# Patient Record
Sex: FEMALE | Race: ASIAN | Employment: FULL TIME | URBAN - METROPOLITAN AREA
[De-identification: names, ages, dates, MRNs, and addresses within clinical notes are randomized per-mention and may not be internally consistent; named-entity substitution may affect disease eponyms.]

---

## 2024-05-22 LAB
AMB EXT HEPATITIS C VIRUS ANTIBODY: NEGATIVE
ANTIBODY SCREEN OB: NEGATIVE
HEPATITIS B SURFACE ANTIGEN OB: NEGATIVE
HIV RESULT OB: NEGATIVE
RH FACTOR OB: POSITIVE

## 2024-07-17 ENCOUNTER — HOSPITAL ENCOUNTER (INPATIENT)
Facility: HOSPITAL | Age: 34
LOS: 3 days | Discharge: HOME | DRG: 817 | End: 2024-07-20
Attending: STUDENT IN AN ORGANIZED HEALTH CARE EDUCATION/TRAINING PROGRAM | Admitting: OBSTETRICS & GYNECOLOGY
Payer: COMMERCIAL

## 2024-07-17 ENCOUNTER — ULTRASOUND ENCOUNTER (OUTPATIENT)
Dept: PERINATAL CARE | Facility: HOSPITAL | Age: 34
End: 2024-07-17
Attending: OBSTETRICS & GYNECOLOGY
Payer: COMMERCIAL

## 2024-07-17 ENCOUNTER — HOSPITAL ENCOUNTER (INPATIENT)
Facility: HOSPITAL | Age: 34
LOS: 3 days | Discharge: HOME | End: 2024-07-20
Attending: STUDENT IN AN ORGANIZED HEALTH CARE EDUCATION/TRAINING PROGRAM | Admitting: OBSTETRICS & GYNECOLOGY
Payer: COMMERCIAL

## 2024-07-17 DIAGNOSIS — O42.912: Primary | ICD-10-CM

## 2024-07-17 LAB
ALBUMIN SERPL-MCNC: 3.8 G/DL (ref 3.2–4.8)
ALBUMIN SERPL-MCNC: 3.9 G/DL (ref 3.2–4.8)
ALBUMIN/GLOB SERPL: 1.2 {RATIO} (ref 1–2)
ALBUMIN/GLOB SERPL: 1.2 {RATIO} (ref 1–2)
ALP LIVER SERPL-CCNC: 75 U/L
ALP LIVER SERPL-CCNC: 76 U/L
ALT SERPL-CCNC: 127 U/L
ALT SERPL-CCNC: 141 U/L
ANION GAP SERPL CALC-SCNC: 6 MMOL/L (ref 0–18)
ANION GAP SERPL CALC-SCNC: 7 MMOL/L (ref 0–18)
ANTIBODY SCREEN: NEGATIVE
AST SERPL-CCNC: 49 U/L (ref ?–34)
AST SERPL-CCNC: 58 U/L (ref ?–34)
BASOPHILS # BLD AUTO: 0.04 X10(3) UL (ref 0–0.2)
BASOPHILS # BLD AUTO: 0.05 X10(3) UL (ref 0–0.2)
BASOPHILS NFR BLD AUTO: 0.2 %
BASOPHILS NFR BLD AUTO: 0.3 %
BILIRUB SERPL-MCNC: 0.3 MG/DL (ref 0.3–1.2)
BILIRUB SERPL-MCNC: 0.4 MG/DL (ref 0.3–1.2)
BUN BLD-MCNC: 5 MG/DL (ref 9–23)
BUN BLD-MCNC: <5 MG/DL (ref 9–23)
CALCIUM BLD-MCNC: 8.9 MG/DL (ref 8.7–10.4)
CALCIUM BLD-MCNC: 9.2 MG/DL (ref 8.7–10.4)
CHLORIDE SERPL-SCNC: 106 MMOL/L (ref 98–112)
CHLORIDE SERPL-SCNC: 108 MMOL/L (ref 98–112)
CO2 SERPL-SCNC: 24 MMOL/L (ref 21–32)
CO2 SERPL-SCNC: 24 MMOL/L (ref 21–32)
CREAT BLD-MCNC: 0.47 MG/DL
CREAT BLD-MCNC: 0.53 MG/DL
EGFRCR SERPLBLD CKD-EPI 2021: 126 ML/MIN/1.73M2 (ref 60–?)
EGFRCR SERPLBLD CKD-EPI 2021: 130 ML/MIN/1.73M2 (ref 60–?)
EOSINOPHIL # BLD AUTO: 0.06 X10(3) UL (ref 0–0.7)
EOSINOPHIL # BLD AUTO: 0.14 X10(3) UL (ref 0–0.7)
EOSINOPHIL NFR BLD AUTO: 0.3 %
EOSINOPHIL NFR BLD AUTO: 0.9 %
ERYTHROCYTE [DISTWIDTH] IN BLOOD BY AUTOMATED COUNT: 13.1 %
ERYTHROCYTE [DISTWIDTH] IN BLOOD BY AUTOMATED COUNT: 13.3 %
GLOBULIN PLAS-MCNC: 3.2 G/DL (ref 2.8–4.4)
GLOBULIN PLAS-MCNC: 3.3 G/DL (ref 2.8–4.4)
GLUCOSE BLD-MCNC: 100 MG/DL (ref 70–99)
GLUCOSE BLD-MCNC: 99 MG/DL (ref 70–99)
HCT VFR BLD AUTO: 37.2 %
HCT VFR BLD AUTO: 38.4 %
HGB BLD-MCNC: 12.7 G/DL
HGB BLD-MCNC: 13.4 G/DL
IMM GRANULOCYTES # BLD AUTO: 0.15 X10(3) UL (ref 0–1)
IMM GRANULOCYTES # BLD AUTO: 0.16 X10(3) UL (ref 0–1)
IMM GRANULOCYTES NFR BLD: 0.7 %
IMM GRANULOCYTES NFR BLD: 1.1 %
LYMPHOCYTES # BLD AUTO: 2.01 X10(3) UL (ref 1–4)
LYMPHOCYTES # BLD AUTO: 2.66 X10(3) UL (ref 1–4)
LYMPHOCYTES NFR BLD AUTO: 17.7 %
LYMPHOCYTES NFR BLD AUTO: 9.8 %
MCH RBC QN AUTO: 29.8 PG (ref 26–34)
MCH RBC QN AUTO: 29.9 PG (ref 26–34)
MCHC RBC AUTO-ENTMCNC: 34.1 G/DL (ref 31–37)
MCHC RBC AUTO-ENTMCNC: 34.9 G/DL (ref 31–37)
MCV RBC AUTO: 85.3 FL
MCV RBC AUTO: 87.5 FL
MONOCYTES # BLD AUTO: 0.93 X10(3) UL (ref 0.1–1)
MONOCYTES # BLD AUTO: 1.25 X10(3) UL (ref 0.1–1)
MONOCYTES NFR BLD AUTO: 6.1 %
MONOCYTES NFR BLD AUTO: 6.2 %
NEUTROPHILS # BLD AUTO: 11.06 X10 (3) UL (ref 1.5–7.7)
NEUTROPHILS # BLD AUTO: 11.06 X10(3) UL (ref 1.5–7.7)
NEUTROPHILS # BLD AUTO: 16.95 X10 (3) UL (ref 1.5–7.7)
NEUTROPHILS # BLD AUTO: 16.95 X10(3) UL (ref 1.5–7.7)
NEUTROPHILS NFR BLD AUTO: 73.8 %
NEUTROPHILS NFR BLD AUTO: 82.9 %
OSMOLALITY SERPL CALC.SUM OF ELEC: 281 MOSM/KG (ref 275–295)
PLATELET # BLD AUTO: 229 10(3)UL (ref 150–450)
PLATELET # BLD AUTO: 234 10(3)UL (ref 150–450)
POTASSIUM SERPL-SCNC: 3.2 MMOL/L (ref 3.5–5.1)
POTASSIUM SERPL-SCNC: 3.6 MMOL/L (ref 3.5–5.1)
PROT SERPL-MCNC: 7 G/DL (ref 5.7–8.2)
PROT SERPL-MCNC: 7.2 G/DL (ref 5.7–8.2)
RBC # BLD AUTO: 4.25 X10(6)UL
RBC # BLD AUTO: 4.5 X10(6)UL
RH BLOOD TYPE: POSITIVE
RH BLOOD TYPE: POSITIVE
SODIUM SERPL-SCNC: 137 MMOL/L (ref 136–145)
SODIUM SERPL-SCNC: 138 MMOL/L (ref 136–145)
WBC # BLD AUTO: 15 X10(3) UL (ref 4–11)
WBC # BLD AUTO: 20.5 X10(3) UL (ref 4–11)

## 2024-07-17 PROCEDURE — 76805 OB US >/= 14 WKS SNGL FETUS: CPT | Performed by: OBSTETRICS & GYNECOLOGY

## 2024-07-17 RX ORDER — CHOLECALCIFEROL (VITAMIN D3) 25 MCG
1 TABLET,CHEWABLE ORAL DAILY
Status: DISCONTINUED | OUTPATIENT
Start: 2024-07-17 | End: 2024-07-20

## 2024-07-17 RX ORDER — SODIUM CHLORIDE, SODIUM LACTATE, POTASSIUM CHLORIDE, CALCIUM CHLORIDE 600; 310; 30; 20 MG/100ML; MG/100ML; MG/100ML; MG/100ML
INJECTION, SOLUTION INTRAVENOUS CONTINUOUS
Status: DISCONTINUED | OUTPATIENT
Start: 2024-07-17 | End: 2024-07-20

## 2024-07-17 RX ORDER — DOCUSATE SODIUM 100 MG/1
100 CAPSULE, LIQUID FILLED ORAL 2 TIMES DAILY
Status: DISCONTINUED | OUTPATIENT
Start: 2024-07-17 | End: 2024-07-18

## 2024-07-17 RX ORDER — AZELAIC ACID 0.15 G/G
1 GEL TOPICAL 2 TIMES DAILY
COMMUNITY

## 2024-07-17 RX ORDER — CHOLECALCIFEROL (VITAMIN D3) 25 MCG
1 TABLET,CHEWABLE ORAL DAILY
COMMUNITY

## 2024-07-17 RX ORDER — ZOLPIDEM TARTRATE 5 MG/1
5 TABLET ORAL NIGHTLY PRN
Status: DISCONTINUED | OUTPATIENT
Start: 2024-07-17 | End: 2024-07-20

## 2024-07-17 RX ORDER — CALCIUM CARBONATE 500 MG/1
1000 TABLET, CHEWABLE ORAL
Status: DISCONTINUED | OUTPATIENT
Start: 2024-07-17 | End: 2024-07-20

## 2024-07-17 RX ORDER — ACETAMINOPHEN 500 MG
1000 TABLET ORAL EVERY 6 HOURS PRN
Status: DISCONTINUED | OUTPATIENT
Start: 2024-07-17 | End: 2024-07-18

## 2024-07-17 RX ORDER — ACETAMINOPHEN 500 MG
500 TABLET ORAL EVERY 6 HOURS PRN
Status: DISCONTINUED | OUTPATIENT
Start: 2024-07-17 | End: 2024-07-20

## 2024-07-17 NOTE — ED PROVIDER NOTES
History     Chief Complaint   Patient presents with    Pregnancy Issues       HPI    34 year old female a1 at 19 weeks gest here for evaluation.  Patient states she got up a couple hours prior to arrival and noted a large gush of fluid from her vaginal area that she felt was her water breaking.  She has been having some cramps in her lower pelvis region since then.  She has a 20-week ultrasound scheduled for next week.  She has not had any prior issues with this pregnancy.  She does have a prior history of laparoscopic myomectomy for fibroids.    During examination patient had another gush of fluid here.      History reviewed. No pertinent past medical history.    History reviewed. No pertinent surgical history.    Social History     Socioeconomic History    Marital status: Single   Tobacco Use    Smoking status: Never    Smokeless tobacco: Never   Vaping Use    Vaping status: Never Used   Substance and Sexual Activity    Alcohol use: Never    Drug use: Never     Social Determinants of Health     Financial Resource Strain: Not at Risk (2024)    Received from [facility]    Financial Resource Strain     Financial Resource Strain: 1   Food Insecurity: Not at Risk (2024)    Received from [facility]    Food Insecurity     Food: 1   Transportation Needs: Not at Risk (2024)    Received from [facility]    Transportation Needs     Transportation: 1   Stress: Not on File (2024)    Received from [facility]    Stress     Stress: 0   Housing Stability: Not at Risk (2024)    Received from [facility]    Housing Stability     Housin                   Physical Exam     ED Triage Vitals   BP 24 0615 108/72   Pulse 24 0615 105   Resp 24 0615 18   Temp 24 0621 98.2 °F (36.8 °C)   Temp src 24 0621 Oral   SpO2 24 0615 100 %   O2 Device 24 0615 None (Room air)       Physical Exam  Constitutional:       General: She is not in acute distress.  Eyes:      Extraocular Movements:  Extraocular movements intact.   Cardiovascular:      Rate and Rhythm: Normal rate.      Pulses: Normal pulses.   Pulmonary:      Effort: Pulmonary effort is normal. No respiratory distress.   Abdominal:      General: There is no distension.      Tenderness: There is abdominal tenderness (pelvic).      Comments: gravid   Genitourinary:     Comments: Sterile external examination with pink fluid originating from vaginal canal, spec exam deferred.  Musculoskeletal:      Cervical back: Normal range of motion.   Neurological:      General: No focal deficit present.      Mental Status: She is alert.              ED Course     Labs Reviewed   CBC WITH DIFFERENTIAL WITH PLATELET    Narrative:     The following orders were created for panel order CBC With Differential With Platelet.  Procedure                               Abnormality         Status                     ---------                               -----------         ------                     CBC W/ DIFFERENTIAL[[id number]]                              In process                   Please view results for these tests on the individual orders.   COMP METABOLIC PANEL (14)   ABORH (BLOOD TYPE)   RAINBOW DRAW LAVENDER   RAINBOW DRAW LIGHT GREEN   RAINBOW DRAW BLUE   RAINBOW DRAW GOLD   CBC W/ DIFFERENTIAL     No results found.        MDM     Vitals:    24 0615 24 0621   BP: 108/72    Pulse: 105    Resp: 18    Temp:  98.2 °F (36.8 °C)   TempSrc:  Oral   SpO2: 100%    Weight: 74.8 kg    Height: 170.2 cm (5' 7\")        Concern for premature rupture of membranes,  labor.  Blood pressure is reassuring.  No bright blood or prior ultrasonography to suggest previa.  I discussed with OB, recommends labor delivery admission for further care  Taken up to OB Triage.             Disposition and Plan     Clinical Impression:  1. Premature rupture of membranes in second trimester, unspecified duration to onset of labor (HCC)        Disposition:  Send to  l&d    Follow-up:  No follow-up provider specified.    Medications Prescribed:  There are no discharge medications for this patient.

## 2024-07-17 NOTE — PROGRESS NOTES
Pt reports increased frequency need for urination, denies cramping, contractions or back pain.   States streaks of bleeding has  stopped when wiping.

## 2024-07-17 NOTE — PROGRESS NOTES
I spoke with patient again after her consult with Dr. Mccarty this morning.  Reviewed findings of previable PPROM, anhydramnios, and prolapsing fetal extremity into cervix.  We reviewed option for induction of labor vs expectant management.  Reviewed with expectant management there is increased risk of infection and resulting sepsis.  Also reviewed potential for reaching viability, but delivering fetus with severe deficits due to early PPROM.  We also reviewed possible risk of IUFD.  She expressed understanding.  At this time will plan expectant management and inpatient observation.  Recommend observing for 24-48 hours after ROM.  If she is stable at that time and does not desire IOL, can consider discharge home with close outpatient follow up.    Tiffanie Younger MD

## 2024-07-17 NOTE — PROGRESS NOTES
34 year old  at 19.5 weeks gestation presents to triage with c/o gush of fluid at 0445 this morning.  Pt c/o mild cramping and some mild spotting this morning as well.  No fluid leaking at this time.  Monitors applied.  Dr Stuart notified of pt arrival on unit.  Plan of care discussed.

## 2024-07-17 NOTE — PROGRESS NOTES
At bedside, emotional support provided.  Dr Stuart at bedside.   POC discussed, Dr. Mccarty to see pt.

## 2024-07-17 NOTE — H&P
[facility]  History & Physical    Kelly Wood Patient Status:  Inpatient    1990 MRN [id number]   Location [facility] LABOR & DELIVERY Attending Sonia Stuart MD   Hosp Day # 0 PCP Oniel Ritter MD     SUBJECTIVE:    Kelly Wood is a 34 year old  woman at 19w5d gestation who presents with LOF at 0445 today.  She reports no vaginal bleeding, and no contractions.  She reports no fevers/chills.    Obstetric History:   OB History    Para Term  AB Living   2             SAB IAB Ectopic Multiple Live Births                  # Outcome Date GA Lbr Gwyn/2nd Weight Sex Type Anes PTL Lv   2 Current            1               Past Medical History: History reviewed. No pertinent past medical history.  Past Social History: History reviewed. No pertinent surgical history.  Family History: No family history on file.  Social History:   Social History     Tobacco Use    Smoking status: Never    Smokeless tobacco: Never   Substance Use Topics    Alcohol use: Never       Home Meds:   No medications prior to admission.     Allergies: No Known Allergies    OBJECTIVE:    Temp:  [98.2 °F (36.8 °C)-98.7 °F (37.1 °C)] 98.7 °F (37.1 °C)  Pulse:  [104-105] 104  Resp:  [18] 18  BP: (108-113)/(66-72) 113/66  SpO2:  [100 %] 100 %    Abdomen:  soft, nontender, gravid   Fetal Surveillance:  120s     Cervix: Grossly ruptured, unable to visualize cervix due to large amount of fluid   Bedside ultrasound: breech, no visible fluid  Lab Review:    Admission on 2024   Component Date Value    ABO BLOOD TYPE 2024 O     RH BLOOD TYPE 2024 Positive     WBC 2024 15.0 (H)     RBC 2024 4.50     HGB 2024 13.4     HCT 2024 38.4     PLT 2024 229.0     MCV 2024 85.3     MCH 2024 29.8     MCHC 2024 34.9     RDW 2024 13.1     Neutrophil Absolute Prel* 2024 11.06 (H)     Neutrophil Absolute 2024 11.06 (H)     Lymphocyte Absolute  07/17/2024 2.66     Monocyte Absolute 07/17/2024 0.93     Eosinophil Absolute 07/17/2024 0.14     Basophil Absolute 07/17/2024 0.05     Immature Granulocyte Abs* 07/17/2024 0.16     Neutrophil % 07/17/2024 73.8     Lymphocyte % 07/17/2024 17.7     Monocyte % 07/17/2024 6.2     Eosinophil % 07/17/2024 0.9     Basophil % 07/17/2024 0.3     Immature Granulocyte % 07/17/2024 1.1         ASSESSMENT:    19w5d gestation, PPROM    PLAN:    Discussed risks of previable PPROM  Reviewed option of expectant management vs IOL including risks to mother  Plan MFM consult

## 2024-07-17 NOTE — PROGRESS NOTES
@ bedside, Pt and  deciding to proceed with expectant management.   Bed changed to post partum bed.   Continued emotional support provided.  Pt continues to reports scant amount of fluid.  No contractions, cramping or back pain.   Abdomen soft, non tender to touch.

## 2024-07-17 NOTE — PROGRESS NOTES
Dr. Mccarty and Dr Younger at bedside.   Ultrasound completed.   All questions answered, POC and options discussed.  Supportive care provided.

## 2024-07-18 ENCOUNTER — ANESTHESIA (OUTPATIENT)
Dept: OBGYN UNIT | Facility: HOSPITAL | Age: 34
End: 2024-07-18
Payer: COMMERCIAL

## 2024-07-18 ENCOUNTER — APPOINTMENT (OUTPATIENT)
Dept: ULTRASOUND IMAGING | Facility: HOSPITAL | Age: 34
End: 2024-07-18
Attending: OBSTETRICS & GYNECOLOGY
Payer: COMMERCIAL

## 2024-07-18 ENCOUNTER — APPOINTMENT (OUTPATIENT)
Dept: ULTRASOUND IMAGING | Facility: HOSPITAL | Age: 34
DRG: 817 | End: 2024-07-18
Attending: OBSTETRICS & GYNECOLOGY
Payer: COMMERCIAL

## 2024-07-18 ENCOUNTER — ANESTHESIA EVENT (OUTPATIENT)
Dept: OBGYN UNIT | Facility: HOSPITAL | Age: 34
End: 2024-07-18
Payer: COMMERCIAL

## 2024-07-18 LAB
ALBUMIN SERPL-MCNC: 4 G/DL (ref 3.2–4.8)
ALBUMIN/GLOB SERPL: 1.2 {RATIO} (ref 1–2)
ALP LIVER SERPL-CCNC: 76 U/L
ALT SERPL-CCNC: 138 U/L
ANION GAP SERPL CALC-SCNC: 6 MMOL/L (ref 0–18)
APTT PPP: 27.5 SECONDS (ref 23–36)
AST SERPL-CCNC: 54 U/L (ref ?–34)
BASOPHILS # BLD AUTO: 0.05 X10(3) UL (ref 0–0.2)
BASOPHILS # BLD AUTO: 0.07 X10(3) UL (ref 0–0.2)
BASOPHILS NFR BLD AUTO: 0.3 %
BASOPHILS NFR BLD AUTO: 0.4 %
BILIRUB SERPL-MCNC: 0.6 MG/DL (ref 0.3–1.2)
BUN BLD-MCNC: <5 MG/DL (ref 9–23)
CALCIUM BLD-MCNC: 9.2 MG/DL (ref 8.7–10.4)
CHLORIDE SERPL-SCNC: 106 MMOL/L (ref 98–112)
CO2 SERPL-SCNC: 23 MMOL/L (ref 21–32)
CREAT BLD-MCNC: 0.51 MG/DL
EGFRCR SERPLBLD CKD-EPI 2021: 127 ML/MIN/1.73M2 (ref 60–?)
EOSINOPHIL # BLD AUTO: 0.12 X10(3) UL (ref 0–0.7)
EOSINOPHIL # BLD AUTO: 0.16 X10(3) UL (ref 0–0.7)
EOSINOPHIL NFR BLD AUTO: 0.7 %
EOSINOPHIL NFR BLD AUTO: 0.8 %
ERYTHROCYTE [DISTWIDTH] IN BLOOD BY AUTOMATED COUNT: 13.1 %
ERYTHROCYTE [DISTWIDTH] IN BLOOD BY AUTOMATED COUNT: 13.3 %
EST. AVERAGE GLUCOSE BLD GHB EST-MCNC: 100 MG/DL (ref 68–126)
FIBRINOGEN PPP-MCNC: 675 MG/DL (ref 200–480)
GLOBULIN PLAS-MCNC: 3.3 G/DL (ref 2.8–4.4)
GLUCOSE BLD-MCNC: 93 MG/DL (ref 70–99)
HBA1C MFR BLD: 5.1 % (ref ?–5.7)
HCT VFR BLD AUTO: 37.7 %
HCT VFR BLD AUTO: 37.9 %
HGB BLD-MCNC: 12.8 G/DL
HGB BLD-MCNC: 13.1 G/DL
IMM GRANULOCYTES # BLD AUTO: 0.11 X10(3) UL (ref 0–1)
IMM GRANULOCYTES # BLD AUTO: 0.12 X10(3) UL (ref 0–1)
IMM GRANULOCYTES NFR BLD: 0.6 %
IMM GRANULOCYTES NFR BLD: 0.7 %
INR BLD: 1.04 (ref 0.8–1.2)
KLEIHAUER BETKE RESULT: NEGATIVE
LYMPHOCYTES # BLD AUTO: 2.24 X10(3) UL (ref 1–4)
LYMPHOCYTES # BLD AUTO: 2.44 X10(3) UL (ref 1–4)
LYMPHOCYTES NFR BLD AUTO: 12.2 %
LYMPHOCYTES NFR BLD AUTO: 12.8 %
MCH RBC QN AUTO: 29.4 PG (ref 26–34)
MCH RBC QN AUTO: 29.6 PG (ref 26–34)
MCHC RBC AUTO-ENTMCNC: 33.8 G/DL (ref 31–37)
MCHC RBC AUTO-ENTMCNC: 34.7 G/DL (ref 31–37)
MCV RBC AUTO: 85.1 FL
MCV RBC AUTO: 86.9 FL
MONOCYTES # BLD AUTO: 1.29 X10(3) UL (ref 0.1–1)
MONOCYTES # BLD AUTO: 1.39 X10(3) UL (ref 0.1–1)
MONOCYTES NFR BLD AUTO: 7 %
MONOCYTES NFR BLD AUTO: 7.3 %
NEUTROPHILS # BLD AUTO: 14.5 X10 (3) UL (ref 1.5–7.7)
NEUTROPHILS # BLD AUTO: 14.5 X10(3) UL (ref 1.5–7.7)
NEUTROPHILS # BLD AUTO: 14.84 X10 (3) UL (ref 1.5–7.7)
NEUTROPHILS # BLD AUTO: 14.84 X10(3) UL (ref 1.5–7.7)
NEUTROPHILS NFR BLD AUTO: 78.1 %
NEUTROPHILS NFR BLD AUTO: 79.1 %
PLATELET # BLD AUTO: 239 10(3)UL (ref 150–450)
PLATELET # BLD AUTO: 242 10(3)UL (ref 150–450)
POTASSIUM SERPL-SCNC: 3.4 MMOL/L (ref 3.5–5.1)
PROT SERPL-MCNC: 7.3 G/DL (ref 5.7–8.2)
PROTHROMBIN TIME: 13.6 SECONDS (ref 11.6–14.8)
RBC # BLD AUTO: 4.36 X10(6)UL
RBC # BLD AUTO: 4.43 X10(6)UL
SODIUM SERPL-SCNC: 135 MMOL/L (ref 136–145)
T PALLIDUM AB SER QL IA: NONREACTIVE
WBC # BLD AUTO: 18.3 X10(3) UL (ref 4–11)
WBC # BLD AUTO: 19 X10(3) UL (ref 4–11)

## 2024-07-18 PROCEDURE — 10D17ZZ EXTRACTION OF PRODUCTS OF CONCEPTION, RETAINED, VIA NATURAL OR ARTIFICIAL OPENING: ICD-10-PCS | Performed by: OBSTETRICS & GYNECOLOGY

## 2024-07-18 PROCEDURE — 76998 US GUIDE INTRAOP: CPT | Performed by: OBSTETRICS & GYNECOLOGY

## 2024-07-18 RX ORDER — MISOPROSTOL 200 UG/1
600 TABLET ORAL EVERY 6 HOURS SCHEDULED
Status: DISCONTINUED | OUTPATIENT
Start: 2024-07-18 | End: 2024-07-19

## 2024-07-18 RX ORDER — IBUPROFEN 600 MG/1
600 TABLET, FILM COATED ORAL EVERY 6 HOURS PRN
Status: DISCONTINUED | OUTPATIENT
Start: 2024-07-18 | End: 2024-07-20

## 2024-07-18 RX ORDER — TRANEXAMIC ACID 10 MG/ML
INJECTION, SOLUTION INTRAVENOUS
Status: DISPENSED
Start: 2024-07-18 | End: 2024-07-19

## 2024-07-18 RX ORDER — BISACODYL 10 MG
10 SUPPOSITORY, RECTAL RECTAL ONCE AS NEEDED
Status: DISCONTINUED | OUTPATIENT
Start: 2024-07-18 | End: 2024-07-20

## 2024-07-18 RX ORDER — SIMETHICONE 80 MG
80 TABLET,CHEWABLE ORAL 3 TIMES DAILY PRN
Status: DISCONTINUED | OUTPATIENT
Start: 2024-07-18 | End: 2024-07-20

## 2024-07-18 RX ORDER — MISOPROSTOL 200 UG/1
400 TABLET ORAL ONCE
Status: COMPLETED | OUTPATIENT
Start: 2024-07-18 | End: 2024-07-18

## 2024-07-18 RX ORDER — ACETAMINOPHEN 500 MG
500 TABLET ORAL EVERY 6 HOURS PRN
Status: DISCONTINUED | OUTPATIENT
Start: 2024-07-18 | End: 2024-07-18

## 2024-07-18 RX ORDER — MISOPROSTOL 200 UG/1
400 TABLET ORAL
Status: DISCONTINUED | OUTPATIENT
Start: 2024-07-18 | End: 2024-07-18

## 2024-07-18 RX ORDER — MISOPROSTOL 200 UG/1
TABLET ORAL
Status: DISPENSED
Start: 2024-07-18 | End: 2024-07-19

## 2024-07-18 RX ORDER — TRANEXAMIC ACID 10 MG/ML
INJECTION, SOLUTION INTRAVENOUS AS NEEDED
Status: DISCONTINUED | OUTPATIENT
Start: 2024-07-18 | End: 2024-07-18 | Stop reason: SURG

## 2024-07-18 RX ORDER — NALBUPHINE HYDROCHLORIDE 10 MG/ML
2.5 INJECTION INTRAMUSCULAR; INTRAVENOUS; SUBCUTANEOUS
Status: DISCONTINUED | OUTPATIENT
Start: 2024-07-18 | End: 2024-07-20

## 2024-07-18 RX ORDER — ACETAMINOPHEN 500 MG
500 TABLET ORAL EVERY 6 HOURS PRN
Status: DISCONTINUED | OUTPATIENT
Start: 2024-07-18 | End: 2024-07-20

## 2024-07-18 RX ORDER — MIDAZOLAM HYDROCHLORIDE 1 MG/ML
INJECTION INTRAMUSCULAR; INTRAVENOUS AS NEEDED
Status: DISCONTINUED | OUTPATIENT
Start: 2024-07-18 | End: 2024-07-18 | Stop reason: SURG

## 2024-07-18 RX ORDER — IBUPROFEN 600 MG/1
600 TABLET, FILM COATED ORAL EVERY 6 HOURS
Status: DISCONTINUED | OUTPATIENT
Start: 2024-07-18 | End: 2024-07-20

## 2024-07-18 RX ORDER — LIDOCAINE HCL/EPINEPHRINE/PF 2%-1:200K
VIAL (ML) INJECTION AS NEEDED
Status: DISCONTINUED | OUTPATIENT
Start: 2024-07-18 | End: 2024-07-18 | Stop reason: SURG

## 2024-07-18 RX ORDER — DOCUSATE SODIUM 100 MG/1
100 CAPSULE, LIQUID FILLED ORAL
Status: DISCONTINUED | OUTPATIENT
Start: 2024-07-18 | End: 2024-07-20

## 2024-07-18 RX ORDER — ONDANSETRON 2 MG/ML
4 INJECTION INTRAMUSCULAR; INTRAVENOUS EVERY 6 HOURS PRN
Status: DISCONTINUED | OUTPATIENT
Start: 2024-07-18 | End: 2024-07-20

## 2024-07-18 RX ORDER — SODIUM CHLORIDE, SODIUM LACTATE, POTASSIUM CHLORIDE, CALCIUM CHLORIDE 600; 310; 30; 20 MG/100ML; MG/100ML; MG/100ML; MG/100ML
INJECTION, SOLUTION INTRAVENOUS CONTINUOUS
Status: DISCONTINUED | OUTPATIENT
Start: 2024-07-18 | End: 2024-07-20

## 2024-07-18 RX ORDER — BUPIVACAINE HCL/0.9 % NACL/PF 0.25 %
5 PLASTIC BAG, INJECTION (ML) EPIDURAL AS NEEDED
Status: DISCONTINUED | OUTPATIENT
Start: 2024-07-18 | End: 2024-07-20

## 2024-07-18 RX ORDER — ACETAMINOPHEN 500 MG
1000 TABLET ORAL EVERY 6 HOURS PRN
Status: DISCONTINUED | OUTPATIENT
Start: 2024-07-18 | End: 2024-07-20

## 2024-07-18 RX ADMIN — SODIUM CHLORIDE, SODIUM LACTATE, POTASSIUM CHLORIDE, CALCIUM CHLORIDE: 600; 310; 30; 20 INJECTION, SOLUTION INTRAVENOUS at 18:45:00

## 2024-07-18 RX ADMIN — SODIUM CHLORIDE, SODIUM LACTATE, POTASSIUM CHLORIDE, CALCIUM CHLORIDE: 600; 310; 30; 20 INJECTION, SOLUTION INTRAVENOUS at 19:52:00

## 2024-07-18 RX ADMIN — MIDAZOLAM HYDROCHLORIDE 1 MG: 1 INJECTION INTRAMUSCULAR; INTRAVENOUS at 19:16:00

## 2024-07-18 RX ADMIN — LIDOCAINE HCL/EPINEPHRINE/PF 5 ML: 2%-1:200K VIAL (ML) INJECTION at 18:46:00

## 2024-07-18 RX ADMIN — MIDAZOLAM HYDROCHLORIDE 1 MG: 1 INJECTION INTRAMUSCULAR; INTRAVENOUS at 19:12:00

## 2024-07-18 RX ADMIN — TRANEXAMIC ACID 1000 MG: 10 INJECTION, SOLUTION INTRAVENOUS at 19:19:00

## 2024-07-18 NOTE — PROGRESS NOTES
Patient comfortable with epidural  SVE: fetal parts partially through cervix  First dose of cytotec administered  Patient and partner declining autopsy and chromosome analysis       Ivon Cunningham MD

## 2024-07-18 NOTE — PROGRESS NOTES
At bedside, questions answered.  Discussion with pt and  to doppler FHTs for recording.   Pts  declines at this time.

## 2024-07-18 NOTE — PROGRESS NOTES
Cervix assess again at bedside, placenta not yet delivered  Will administer additional dose of cytotec  Bleeding stable  If placenta still not delivered, will plan to proceed with suction D&C

## 2024-07-18 NOTE — PROGRESS NOTES
Met with patient at bedside  Umbilical cord prolapse noted this AM at approximately 0500 with lack of umbilical cord pulsation; patient aware that this likely means fetus has passed, findings confirmed on bedside ultrasound  Patient also reporting foul odor when she went to the bathroom most recently, denies pain, fevers, chills, etc at this time  SVE: Fetal parts starting to come through cervix, dilation approximately 4-5cm at this time  Given IUFD and possible developing chorioamnionitis, recommend induction with cytotec as well as antibiotics now  Discussed pain control with epidural prior to initiation of induction of labor, patient agreeable  Discussed induction of labor with cytotec with history of myomectomy is not contraindicated in the second trimester  Discussed possible need for suction D&C for removal of placenta if it does not spontaneously deliver  Patient and family members understandably upset and with lots of questions about how this all happened, all questions answered        Ivon Cunningham MD

## 2024-07-18 NOTE — PROGRESS NOTES
RN called to room. Pt states she \"feels something coming through her cervix\". Visualized perineum and what appeared to be a possible loop of the umbilical cord. Pt denies feeling any pain or an urge to push, appears comfortable at this time.  Dr. Younger in house and notified via phone. Dr. Younger states to continue expectant management.

## 2024-07-18 NOTE — L&D DELIVERY NOTE
Nina, Pending [[id number]]      Labor Events     labor?: No   steroids?: None  Antibiotics received during labor?: No  Rupture date/time: 2024 0445     Rupture type: SROM  Fluid color: Clear  Labor type: Spontaneous Onset of Labor        Presentation    No data filed       Operative Delivery    No data filed       Shoulder Dystocia    No data filed       Anesthesia    Method: Epidural               Delivery      Delivery date/time:  24 15:22:00   Delivery type: Normal spontaneous vaginal delivery    Details:     Delivery location: delivery room       Delivery Providers       Delivery personnel:  Provider Role    Baby Nurse    Delivery Nurse             Cord    No data filed        Measurements    No data filed       Placenta    Removal: Spontaneous       Apgars    No data filed       Skin to Skin    No data filed       Vaginal Count    No data filed       Lacerations    No data filed                                                                      Vaginal Delivery Note          Kelly Wood Patient Status:  Inpatient    1990 MRN [id number]   [facility] LABOR & DELIVERY Attending Sonia Stuart MD   Hosp Day # 1 PCP Oniel Ritter MD       Delivery: Normal spontaneous vaginal delivery  Surgeon: Ivon Cunningham MD  Anesthesia: Epidural  QBL: pending  Infant: female    Brief history and labor course:    Patient was already delivered by the time I was able to get to the room. Grossly normal appearing  was noted.    Placenta not delivered when I arrived. On vaginal exam, placenta was partially in cervix. Bleeding stable. Will continue to monitor at this time and defer surgical management for removal of placenta provided bleeding continues to stay stable.    Complications:  None    Ivon Cunningham MD

## 2024-07-18 NOTE — PROGRESS NOTES
RN @ bedside. Pt declines ultrasound at this time. States she would like to wait a day or two to prepare herself mentally for what might be found.

## 2024-07-18 NOTE — ANESTHESIA PROCEDURE NOTES
Labor Analgesia    Date/Time: 7/18/2024 1:35 PM    Performed by: Jewel Gutierrez DO  Authorized by: Jeewl Gutierrez DO      General Information and Staff    Start Time:  7/18/2024 1:35 PM  End Time:  7/18/2024 1:37 PM  Anesthesiologist:  Jewel Gutierrez DO  Performed by:  Anesthesiologist  Patient Location:  OB  Site Identification: surface landmarks    Reason for Block: labor epidural    Preanesthetic Checklist: patient identified, IV checked, risks and benefits discussed, monitors and equipment checked, pre-op evaluation, timeout performed, IV bolus, anesthesia consent and sterile technique used      Procedure Details    Patient Position:  Sitting  Prep: ChloraPrep    Monitoring:  Heart rate and continuous pulse ox  Approach:  Midline    Epidural Needle    Injection Technique:  YOUSIF air  Needle Type:  Tuohy  Needle Gauge:  17 G  Needle Length:  3.375 in  Needle Insertion Depth:  4  Location:  L3-4    Spinal Needle      Catheter    Catheter Type:  End hole  Catheter Size:  19 G  Catheter at Skin Depth:  11  Test Dose:  Negative    Assessment    Sensory Level:  T10    Additional Comments

## 2024-07-18 NOTE — PROGRESS NOTES
Share referral received. Met with patient and family at bedside to provide support. Comfort tote brought to patient. Share information given. Offered family mementos; pictures/footprints. Patient declined at this time. Patient aware will receive follow up call within the week. Will continue to be available for further support as needed.

## 2024-07-18 NOTE — PROGRESS NOTES
Spoke with patient again this evening.  Family report being hopeful for a positive outcome.  I reiterated that this circumstance is dire for fetus, and the chance of survival is very low.  WBC increased to 20.  LFTs elevated, unclear etiology.  BP is normal.  Plan to check RUQ US.

## 2024-07-18 NOTE — ANESTHESIA PREPROCEDURE EVALUATION
PRE-OP EVALUATION    Patient Name: Kelly Wood    Admit Diagnosis: Premature rupture of membranes in second trimester, unspecified duration to onset of labor (HCC) [O42.912]    Pre-op Diagnosis: * No pre-op diagnosis entered *        Anesthesia Procedure: LABOR ANALGESIA    * No surgeons found in log *    Pre-op vitals reviewed.  Temp: 98.7 °F (37.1 °C)  Pulse: 103  Resp: 16  BP: 100/78     Body mass index is 25.22 kg/m².    Current medications reviewed.  Hospital Medications:   oxyTOCIN in sodium chloride 0.9% (Pitocin) 30 Units/500mL infusion premix        acetaminophen (Tylenol Extra Strength) tab 500 mg  500 mg Oral Q6H PRN    ibuprofen (Motrin) tab 600 mg  600 mg Oral Q6H PRN    ondansetron (Zofran) 4 MG/2ML injection 4 mg  4 mg Intravenous Q6H PRN    lactated ringers infusion   Intravenous Continuous    miSOPROStol (Cytotec) tab 400 mcg  400 mcg Vaginal Once    Followed by    miSOPROStol (Cytotec) tab 400 mcg  400 mcg Vaginal 6 times per day    ampicillin (Omnipen) 2 g in sodium chloride 0.9% 100 mL IVPB-MBP  2 g Intravenous Q6H    gentamicin (Garamycin) 300 mg in sodium chloride 0.9% 100 mL IVPB  5 mg/kg (Ideal) Intravenous Daily    lactated ringers IV bolus 1,000 mL  1,000 mL Intravenous Once    fentaNYL-bupivacaine 2 mcg/mL-0.125% in sodium chloride 0.9% 100 mL EPIDURAL infusion premix  12 mL/hr Epidural Continuous    fentaNYL (Sublimaze) 50 mcg/mL injection 100 mcg  100 mcg Epidural Once    EPHEDrine (PF) 25 MG/5 ML injection 5 mg  5 mg Intravenous PRN    nalbuphine (Nubain) 10 mg/mL injection 2.5 mg  2.5 mg Intravenous Q15 Min PRN    lactated ringers infusion   Intravenous Continuous    acetaminophen (Tylenol Extra Strength) tab 500 mg  500 mg Oral Q6H PRN    Or    acetaminophen (Tylenol Extra Strength) tab 1,000 mg  1,000 mg Oral Q6H PRN    zolpidem (Ambien) tab 5 mg  5 mg Oral Nightly PRN    docusate sodium (Colace) cap 100 mg  100 mg Oral BID    calcium carbonate (Tums) chewable tab 1,000 mg  1,000  mg Oral Daily PRN    prenatal vitamin with DHA (PNV with DHA) cap 1 capsule  1 capsule Oral Daily       Outpatient Medications:     Medications Prior to Admission   Medication Sig Dispense Refill Last Dose    prenatal vitamin with DHA 27-0.8-228 MG Oral Cap Take 1 capsule by mouth daily.   2024    Azelaic Acid 15 % External Gel Apply 1 Application topically 2 (two) times daily.   More than a month       Allergies: Patient has no known allergies.      Anesthesia Evaluation    Patient summary reviewed.    Anesthetic Complications  (-) history of anesthetic complications         GI/Hepatic/Renal    Negative GI/hepatic/renal ROS.                             Cardiovascular        Exercise tolerance: good     MET: >4                                           Endo/Other    Negative endo/other ROS.                              Pulmonary    Negative pulmonary ROS.                       Neuro/Psych    Negative neuro/psych ROS.                           fetal demise 19w, platelets ok        Past Surgical History:   Procedure Laterality Date    Prior myomectomy       Social History     Socioeconomic History    Marital status: Single   Tobacco Use    Smoking status: Never    Smokeless tobacco: Never   Vaping Use    Vaping status: Never Used   Substance and Sexual Activity    Alcohol use: Never    Drug use: Never     History   Drug Use Unknown     Available pre-op labs reviewed.  Lab Results   Component Value Date    WBC 18.3 (H) 2024    RBC 4.36 2024    HGB 12.8 2024    HCT 37.9 2024    MCV 86.9 2024    MCH 29.4 2024    MCHC 33.8 2024    RDW 13.3 2024    .0 2024     Lab Results   Component Value Date     (L) 2024    K 3.4 (L) 2024     2024    CO2 23.0 2024    BUN <5 (L) 2024    CREATSERUM 0.51 (L) 2024    GLU 93 2024    CA 9.2 2024     Lab Results   Component Value Date    INR 1.04 2024          Airway      Mallampati: II  Mouth opening: 3 FB  TM distance: > 6 cm  Neck ROM: full Cardiovascular      Rhythm: regular  Rate: normal     Dental             Pulmonary      Breath sounds clear to auscultation bilaterally.               Other findings              ASA: 2   Plan: epidural  NPO status verified and         Comment: discussed  Plan/risks discussed with: patient                Present on Admission:  **None**

## 2024-07-18 NOTE — PROGRESS NOTES
Share referral received due to poor prognosis of viability. After review of pt's chart, it appears that patient and her partner are still hopeful for a positive outcome. Share referral deferred at this time. Will be available for further support as needed.

## 2024-07-18 NOTE — PROGRESS NOTES
Called to room by family. Patient states \"something is coming out\"  Lifted sheet and fetus was spontaneously delivering.  Time of delivery 1522. No signs of life at delivery.  Cord clamped and cut.  Infant taken to warmer.  No heart tones, breathing or movement

## 2024-07-19 PROBLEM — R45.851 PASSIVE SUICIDAL IDEATIONS: Status: ACTIVE | Noted: 2024-07-19

## 2024-07-19 PROBLEM — F43.21 GRIEF: Status: ACTIVE | Noted: 2024-07-19

## 2024-07-19 LAB
ALBUMIN SERPL-MCNC: 3.5 G/DL (ref 3.2–4.8)
ALBUMIN/GLOB SERPL: 1.2 {RATIO} (ref 1–2)
ALP LIVER SERPL-CCNC: 70 U/L
ALT SERPL-CCNC: 99 U/L
ANION GAP SERPL CALC-SCNC: 6 MMOL/L (ref 0–18)
APTT PPP: 29.4 SECONDS (ref 23–36)
AST SERPL-CCNC: 36 U/L (ref ?–34)
B2 GLYCOPROT1 IGG SERPL IA-ACNC: 6.7 U/ML (ref ?–7)
B2 GLYCOPROT1 IGM SERPL IA-ACNC: 2.4 U/ML (ref ?–7)
BASOPHILS # BLD AUTO: 0.04 X10(3) UL (ref 0–0.2)
BASOPHILS NFR BLD AUTO: 0.2 %
BILIRUB SERPL-MCNC: 0.2 MG/DL (ref 0.3–1.2)
BUN BLD-MCNC: 8 MG/DL (ref 9–23)
CALCIUM BLD-MCNC: 8.8 MG/DL (ref 8.7–10.4)
CARDIOLIPIN IGG SERPL-ACNC: 1.3 GPL (ref ?–10)
CARDIOLIPIN IGM SERPL-ACNC: 3.8 MPL (ref ?–10)
CHLORIDE SERPL-SCNC: 108 MMOL/L (ref 98–112)
CMV AB, IGM, INTERP: NEGATIVE
CMV AB, IGM, INTERP: NEGATIVE
CMV AB, IGM: <0.2 AI
CMV AB, IGM: <0.2 AI
CMV IGG AB: 5.2 U/ML
CO2 SERPL-SCNC: 24 MMOL/L (ref 21–32)
CREAT BLD-MCNC: 0.51 MG/DL
EGFRCR SERPLBLD CKD-EPI 2021: 127 ML/MIN/1.73M2 (ref 60–?)
EOSINOPHIL # BLD AUTO: 0.12 X10(3) UL (ref 0–0.7)
EOSINOPHIL NFR BLD AUTO: 0.7 %
ERYTHROCYTE [DISTWIDTH] IN BLOOD BY AUTOMATED COUNT: 13.1 %
GLOBULIN PLAS-MCNC: 2.9 G/DL (ref 2.8–4.4)
GLUCOSE BLD-MCNC: 95 MG/DL (ref 70–99)
HCT VFR BLD AUTO: 34.3 %
HGB BLD-MCNC: 11.6 G/DL
IMM GRANULOCYTES # BLD AUTO: 0.12 X10(3) UL (ref 0–1)
IMM GRANULOCYTES NFR BLD: 0.7 %
INR BLD: 1.04 (ref 0.85–1.16)
LA 3 SCREEN W REFLEX-IMP: POSITIVE
LYMPHOCYTES # BLD AUTO: 2.85 X10(3) UL (ref 1–4)
LYMPHOCYTES NFR BLD AUTO: 17.1 %
MCH RBC QN AUTO: 29.4 PG (ref 26–34)
MCHC RBC AUTO-ENTMCNC: 33.8 G/DL (ref 31–37)
MCV RBC AUTO: 87.1 FL
MONOCYTES # BLD AUTO: 1.09 X10(3) UL (ref 0.1–1)
MONOCYTES NFR BLD AUTO: 6.5 %
NEUTROPHILS # BLD AUTO: 12.45 X10 (3) UL (ref 1.5–7.7)
NEUTROPHILS # BLD AUTO: 12.45 X10(3) UL (ref 1.5–7.7)
NEUTROPHILS NFR BLD AUTO: 74.8 %
OSMOLALITY SERPL CALC.SUM OF ELEC: 284 MOSM/KG (ref 275–295)
PLATELET # BLD AUTO: 230 10(3)UL (ref 150–450)
POTASSIUM SERPL-SCNC: 3.6 MMOL/L (ref 3.5–5.1)
PROT SERPL-MCNC: 6.4 G/DL (ref 5.7–8.2)
PROTHROMBIN TIME: 13.6 SECONDS (ref 11.6–14.8)
RBC # BLD AUTO: 3.94 X10(6)UL
SCREEN DRVVT: 1.11 S (ref 0–1.29)
SCREEN DRVVT: NEGATIVE S
SODIUM SERPL-SCNC: 138 MMOL/L (ref 136–145)
STACLOT LA DELTA: 13.6 SECONDS (ref ?–8)
WBC # BLD AUTO: 16.7 X10(3) UL (ref 4–11)

## 2024-07-19 PROCEDURE — 90792 PSYCH DIAG EVAL W/MED SRVCS: CPT

## 2024-07-19 RX ORDER — DIPHENHYDRAMINE HYDROCHLORIDE 50 MG/ML
INJECTION, SOLUTION INTRAMUSCULAR; INTRAVENOUS
Status: COMPLETED
Start: 2024-07-19 | End: 2024-07-19

## 2024-07-19 RX ORDER — DIPHENHYDRAMINE HYDROCHLORIDE 50 MG/ML
25 INJECTION, SOLUTION INTRAMUSCULAR; INTRAVENOUS EVERY 4 HOURS PRN
Status: DISCONTINUED | OUTPATIENT
Start: 2024-07-19 | End: 2024-07-20

## 2024-07-19 NOTE — PROGRESS NOTES
Postpartum Day 1    Pt without complaints. S/p  previable fetus, D&C.  Denies fevers/chills.      Temp: 98.5 °F (36.9 °C)  Pulse: 95  Resp: 16  BP: 102/52  abd  soft, NT, ND, fundus firm below umbilicus  extr  trace edema, no calf tenderness  Recent Labs   Lab 24  0958   RBC 3.94   HGB 11.6*   HCT 34.3*   MCV 87.1   MCH 29.4   MCHC 33.8   RDW 13.1   NEPRELIM 12.45*   WBC 16.7*   .0       Impression: PPD#1  Plan:  Continue postpartum care.    Continue antibiotics    Abdominal ultrasound

## 2024-07-19 NOTE — CM/SW NOTE
AMISH order acknowledged. Case discussed with RN.  AMISH met with patient, asked other family members to leave the room. Patient tearful affect.   IUFD 19w5d. Patient reports this is her second loss, first loss occurred during first trimester.   Patient reports she has never gotten over the death of her first baby and she does not know how she is going to get over this death. SW provided support and validation.   Patient reports feeling extreme guilt and is scared to go home, due to her no longer being pregnant. Patient reports passive SI, \"I wish I never wake up again\".   AMISH updated RN. Psych on consult. Psych to evaluate.     Swati Hudson, [facility]  /Discharge Planner

## 2024-07-19 NOTE — PLAN OF CARE
Problem: PAIN - ADULT  Goal: Verbalizes/displays adequate comfort level or patient's stated pain goal  Description: INTERVENTIONS:  - Encourage pt to monitor pain and request assistance  - Assess pain using appropriate pain scale  - Administer analgesics based on type and severity of pain and evaluate response  - Implement non-pharmacological measures as appropriate and evaluate response  - Consider cultural and social influences on pain and pain management  - Manage/alleviate anxiety  - Utilize distraction and/or relaxation techniques  - Monitor for opioid side effects  - Notify MD/LIP if interventions unsuccessful or patient reports new pain  - Anticipate increased pain with activity and pre-medicate as appropriate  Outcome: Progressing     Problem: ANXIETY  Goal: Will report anxiety at manageable levels  Description: INTERVENTIONS:  - Administer medication as ordered  - Teach and rehearse alternative coping skills  - Provide emotional support with 1:1 interaction with staff  Outcome: Progressing     Problem: Patient/Family Goals  Goal: Patient/Family Long Term Goal  Description: Patient's Long Term Goal: To return home tomorrow.    Interventions:    - See additional Care Plan goals for specific interventions  Outcome: Progressing  Goal: Patient/Family Short Term Goal  Description: Patient's Short Term Goal: To recover with minimal bleeding and infection free.    Interventions:     - See additional Care Plan goals for specific interventions  Outcome: Progressing     Problem: Anxiety  Goal: Anxiety is at manageable level  Outcome: Progressing     Problem: Spiritual Needs  Goal: Ability to function at adequate level  Outcome: Progressing     Problem: BIRTH - VAGINAL/ SECTION  Goal: Fetal and maternal status remain reassuring during the birth process  Description: INTERVENTIONS:  - Monitor vital signs  - Monitor fetal heart rate  - Monitor uterine activity  - Monitor labor progression (vaginal delivery)  -  DVT prophylaxis (C/S delivery)  - Surgical antibiotic prophylaxis (C/S delivery)  Outcome: Completed

## 2024-07-19 NOTE — PAYOR COMM NOTE
--------------  ADMISSION REVIEW     Payor: [employer] OUT [employer] STATE P[employer]  Subscriber #:  [id number]  Authorization Number: [id number]    Admit date: 24  Admit time:  6:38 AM       REVIEW DOCUMENTATION:     ED Provider Notes          History     Chief Complaint   Patient presents with    Pregnancy Issues       HPI    34 year old female a1 at 19 weeks gest here for evaluation.  Patient states she got up a couple hours prior to arrival and noted a large gush of fluid from her vaginal area that she felt was her water breaking.  She has been having some cramps in her lower pelvis region since then.  She has a 20-week ultrasound scheduled for next week.  She has not had any prior issues with this pregnancy.  She does have a prior history of laparoscopic myomectomy for fibroids.    During examination patient had another gush of fluid here.      History reviewed. No pertinent past medical history.    History reviewed. No pertinent surgical history.        Physical Exam     ED Triage Vitals   BP 24 0615 108/72   Pulse 24 0615 105   Resp 24 0615 18   Temp 24 0621 98.2 °F (36.8 °C)   Temp src 24 0621 Oral   SpO2 24 0615 100 %   O2 Device 24 0615 None (Room air)       Physical Exam  Constitutional:       General: She is not in acute distress.  Eyes:      Extraocular Movements: Extraocular movements intact.   Cardiovascular:      Rate and Rhythm: Normal rate.      Pulses: Normal pulses.   Pulmonary:      Effort: Pulmonary effort is normal. No respiratory distress.   Abdominal:      General: There is no distension.      Tenderness: There is abdominal tenderness (pelvic).      Comments: gravid   Genitourinary:     Comments: Sterile external examination with pink fluid originating from vaginal canal, spec exam deferred.  Musculoskeletal:      Cervical back: Normal range of motion.   Neurological:      General: No focal deficit present.      Mental Status: She is alert.              ED  Course     Labs Reviewed   CBC WITH DIFFERENTIAL WITH PLATELET    Narrative:     The following orders were created for panel order CBC With Differential With Platelet.  Procedure                               Abnormality         Status                     ---------                               -----------         ------                     CBC W/ DIFFERENTIAL[[id number]]                              In process                   Please view results for these tests on the individual orders.   COMP METABOLIC PANEL (14)   ABORH (BLOOD TYPE)   RAINBOW DRAW LAVENDER   RAINBOW DRAW LIGHT GREEN   RAINBOW DRAW BLUE   RAINBOW DRAW GOLD   CBC W/ DIFFERENTIAL     No results found.        [facility]     Vitals:    24 0615 24 0621   BP: 108/72    Pulse: 105    Resp: 18    Temp:  98.2 °F (36.8 °C)   TempSrc:  Oral   SpO2: 100%    Weight: 74.8 kg    Height: 170.2 cm (5' 7\")        Concern for premature rupture of membranes,  labor.  Blood pressure is reassuring.  No bright blood or prior ultrasonography to suggest previa.  I discussed with OB, recommends labor delivery admission for further care  Taken up to OB Triage.             Disposition and Plan     Clinical Impression:  1. Premature rupture of membranes in second trimester, unspecified duration to onset of labor (HCC)        Disposition:  Send to l&d    Follow-up:  No follow-up provider specified.    Medications Prescribed:  There are no discharge medications for this patient.        Signed by Abel Fajardo MD on 2024  6:43 AM               History and Physical    H&P signed by Sonia Stuart MD at 2024  7:19 AM      [facility]  History & Physical         SUBJECTIVE:     Kelly Wood is a 34 year old  woman at 19w5d gestation who presents with LOF at 0445 today.  She reports no vaginal bleeding, and no contractions.  She reports no fevers/chills.     Obstetric History:                    OB History    Para Term  AB Living    2             SAB IAB Ectopic Multiple Live Births                         # Outcome Date GA Lbr [place]/2nd Weight Sex Type Anes PTL Lv   2 Current                     1                         Past Medical History: History reviewed. No pertinent past medical history.  Past Social History: History reviewed. No pertinent surgical history.  Family History: No family history on file.  Social History:   Social History           Tobacco Use    Smoking status: Never    Smokeless tobacco: Never   Substance Use Topics    Alcohol use: Never         Home Meds:   No medications prior to admission.      Allergies: No Known Allergies     OBJECTIVE:     Temp:  [98.2 °F (36.8 °C)-98.7 °F (37.1 °C)] 98.7 °F (37.1 °C)  Pulse:  [104-105] 104  Resp:  [18] 18  BP: (108-113)/(66-72) 113/66  SpO2:  [100 %] 100 %           Abdomen:  soft, nontender, gravid    Fetal Surveillance:  120s      Cervix: Grossly ruptured, unable to visualize cervix due to large amount of fluid   Bedside ultrasound: breech, no visible fluid  Lab Review:           Admission on 2024   Component Date Value    ABO BLOOD TYPE 2024 O     RH BLOOD TYPE 2024 Positive     WBC 2024 15.0 (H)     RBC 2024 4.50     HGB 2024 13.4     HCT 2024 38.4     PLT 2024 229.0     MCV 2024 85.3     MCH 2024 29.8     MCHC 2024 34.9     RDW 2024 13.1     Neutrophil Absolute Prel* 2024 11.06 (H)     Neutrophil Absolute 2024 11.06 (H)     Lymphocyte Absolute 2024 2.66     Monocyte Absolute 2024 0.93     Eosinophil Absolute 2024 0.14     Basophil Absolute 2024 0.05     Immature Granulocyte Abs* 2024 0.16     Neutrophil % 2024 73.8     Lymphocyte % 2024 17.7     Monocyte % 2024 6.2     Eosinophil % 2024 0.9     Basophil % 2024 0.3     Immature Granulocyte % 2024 1.1          ASSESSMENT:     19w5d gestation, PPROM     PLAN:     Discussed  risks of previable PPROM  Reviewed option of expectant management vs IOL including risks to mother  Plan MFM consult            Signed by Sonia Stuart MD on 7/17/2024  7:19 AM       Consult 7/17  Mid-trimester PPROM -      I did review some correctable issues that may lead to mid-trimester PROM.  First I reviewed that uterine malformations may predispose to mid-trimester PROM.  Given the patient's prior myomectomy, congenital malformation of the uterus that was not previously diagnosed is low.  The myomectomy is unlikely to have directly influenced this outcome.  However, an issue such as uterine synechiae may have developed and theoretically lead to uterine cavity compromise.  This is however quite unlikely.  Nevertheless, I advised a uterine cavity evaluation via hysterosonogram or hysterosalpingogram.  If a correctable uterine cavity defect is noted surgical correction would be advised prior to conceiving.     I also reviewed the potential of cervical insufficiency.   We discussed that I strongly suspect cervical insufficiency as the cause, mainly due to her report of increased discharge recently and that the fetal arm is noted in the length of the cervix.     They asked about treatments such as medications to \"heal\" the tear and cervical cerclage.  I explained that such treatment is not available here and that such protocols are experimental.       We discussed the very poor prognosis when there is no residual amniotic fluid.  I explained that the fetus will continue to make amniotic fluid but that it will continue to leak out due to the rupture and that the fetal arm appears to be delivering into the cervix.  I also explained that the dilation, rupture and lack of residual fluid increases her risk for intrauterine infection.  I explained that infection can spread to a life risking process called sepsis.  If a women has PPROM and sepsis in the second trimester, hysterectomy is often needed to save her  life.  I explained that there is no treatment for a fetus at <22 weeks.     We discussed her h/o myomectomy and that IOL at this GA is not contraindicated.  I advised against hysterotomy unless needed at a maternal life preserving measure.  We discussed risks in future pregnancies and briefly anticipated management in future pregnancies (prophylactic cerclage vs serial ultrasound cervical length.     IMPRESSION:    IUP at 19w5d  Mid-trimester PPROM with anhydramnios and evidence of cervical insufficiency  Elevated maternal LFTs, etiology unknown  H/o myomectomy     RECOMMENDATIONS:    I advised moving forward with augmentation/ induction of labor for the health and well being of the patient in light of her early GA and lack of amniotic fluid  Expectant management in the hospital 24 hours with repeat CBC and CMP in ~12 hours     Dr. Hagen was present for the ultrasound and consultation.  She answered additional questions from the patient and her , Al.     Total time spent 60 minutes this calendar day which includes preparing to see the patient including chart review, obtaining and/or reviewing additional medical history, performing a physical exam and evaluation, documenting clinical information in the electronic medical record, independently interpreting results, counseling the patient, communicating results to the patient/family/caregiver and coordinating care.     Case discussed with patient who demonstrated understanding and agreement with plan.     Thank you for allowing me to participate in the care of this patient.  Please feel free to contact me with any questions.     Alexus Mccarty MD  Maternal-Fetal Medicine     7/17/2024  8:27 AM    7/18 procedure    Operative Report signed by Ivon Cunningham MD at 7/18/2024  8:59 PM    Author: Ivon Cunningham MD Service: Obstetrics Author Type: Physician   Filed: 7/18/2024  8:59 PM Date of Service: 7/18/2024  7:07 PM Status: Signed   : Ivon Cunningham MD  (Physician)     [facility]  Operative Note     Pre-Op Diagnosis: retained placenta     Post-Op Diagnosis: same     Procedure: suction D&C under ultrasound guidance     Surgeon: Ivon Cunningham MD     Anesthesia: epidural     Surgical Findings:  Grossly normal appearing placenta  Normal appearing cervix and vagina  Thin endometrial stripe on ultrasound post-procedure     Specimen: placenta     EBL:  100cc during procedure, approximately 450cc since delivery     Complications: None     Disposition: Recovery room in stable condition     Patient was taken to operating room and she already had an epidural from labor process. She was prepped and draped in the usual sterile fashion in the dorsal lithotomy position. EUA revealed the aforementioned findings. Pierre retractors were placed in the vagina and cervix was identified. The majority of the placenta was seen coming through the cervix and this was removed. Anterior lip of cervix was grasped with a ring forcep.  A size 14 curved curette was inserted into the uterus and suction device was activated under ultrasound guidance. Suction was rotated until all POCs were removed from the uterine cavity. A sharp curettage was then done and a gritty texture was noted at the uterine surface. A final pass with the suction was made. Instruments were removed from the vagina. Cytotec 6000mcg was inserted rectally and TXA 1g IV was administered at the end of the procedure. Patient tolerated the procedure well. Counts were correct x2.     Ivon Cunningham MD            delivery notes  Nina, Angelikaing [[id number]]       Labor Events     labor?: No   steroids?: None  Antibiotics received during labor?: No  Rupture date/time: 2024 0445     Rupture type: SROM  Fluid color: Clear  Labor type: Spontaneous Onset of Labor         [place] Presentation    No data filed         Operative Delivery    No data filed         Shoulder Dystocia    No data filed          Anesthesia    Method: Epidural                    [place] Delivery       Delivery date/time:  24 15:22:00   Delivery type: Normal spontaneous vaginal delivery     Details:      Delivery location: delivery room         Delivery Providers         Delivery personnel:  Provider Role     Baby Nurse     Delivery Nurse                Cord    No data filed         [place] Measurements    No data filed         Placenta    Removal: Spontaneous         Apgars    No data filed         Skin to Skin    No data filed         Vaginal Count    No data filed         Lacerations    No data filed                                                                           Vaginal Delivery Note                    Kelly Wood Patient Status:  Inpatient    1990 MRN [id number]   Location [facility] LABOR & DELIVERY Attending Sonia Stuart MD   Hosp Day # 1 PCP Oniel Ritter MD         Delivery: Normal spontaneous vaginal delivery  Surgeon: Ivon Cunningham MD  Anesthesia: Epidural  QBL: pending  Infant: female     Brief history and labor course:     Patient was already delivered by the time I was able to get to the room. Grossly normal appearing  was noted.     Placenta not delivered when I arrived. On vaginal exam, placenta was partially in cervix. Bleeding stable. Will continue to monitor at this time and defer surgical management for removal of placenta provided bleeding continues to stay stable.     Complications:  None     Ivon Cunningham MD      Postpartum Day 1     Pt without complaints. S/p  previable fetus, D&C.  Denies fevers/chills.       Temp: 98.5 °F (36.9 °C)  Pulse: 95  Resp: 16  BP: 102/52  abd                      soft, NT, ND, fundus firm below umbilicus  extr                      trace edema, no calf tenderness      Recent Labs   Lab 24  0958   RBC 3.94   HGB 11.6*   HCT 34.3*   MCV 87.1   MCH 29.4   MCHC 33.8   RDW 13.1   NEPRELIM 12.45*   WBC 16.7*   .0          Impression:       PPD#1  Plan:                   Continue postpartum care.                              Continue antibiotics                              Abdominal ultrasound       MEDICATIONS ADMINISTERED IN LAST 1 DAY:  ampicillin (Omnipen) 2 g in sodium chloride 0.9% 100 mL IVPB-MBP       Date Action Dose Route User    7/19/2024 1342 New Bag 2 g Intravenous Oriana Erwin RN    7/19/2024 0631 New Bag 2 g Intravenous Ginger Olivares RN    7/19/2024 0105 New Bag 2 g Intravenous Ginger Olivares RN    7/18/2024 2009 New Bag 2 g Intravenous Ginger Olivares RN          docusate sodium (Colace) cap 100 mg       Date Action Dose Route User    7/19/2024 1016 Given 100 mg Oral Oriana Erwin RN    7/18/2024 2057 Given 100 mg Oral Ginger Olivares RN          doxycycline hyclate (Vibramycin) 200 mg in sodium chloride 0.9% 250 mL IVPB       Date Action Dose Route User    7/18/2024 2057 New Bag 200 mg Intravenous Ginger Olivares RN          gentamicin (Garamycin) 300 mg in sodium chloride 0.9% 100 mL IVPB       Date Action Dose Route User    7/19/2024 1145 New Bag 300 mg Intravenous (Left Lower Arm) Oriana Erwin RN          ibuprofen (Motrin) tab 600 mg       Date Action Dose Route User    7/19/2024 1016 Given 600 mg Oral Oriana Erwin RN    7/18/2024 2057 Given 600 mg Oral Ginger Olivares RN          lactated ringers infusion       Date Action Dose Route User    7/18/2024 1845 New Bag (none) Intravenous Mohsen, Ismail, DO          lidocaine 2%-EPINEPHrine 1:200,000 (Xylocaine-Epinephrine) injection       Date Action Dose Route User    7/18/2024 1846 Given 5 mL Epidural Mohsen, Ismail, DO          midazolam (Versed) 2 MG/2ML injection       Date Action Dose Route User    7/18/2024 1916 Given 1 mg Intravenous Mohsen, Ismail, DO    7/18/2024 1912 Given 1 mg Intravenous Mohsen, Ismail, DO          miSOPROStol (Cytotec) tab 400 mcg       Date Action Dose Route User    7/18/2024 1420 Given 400 mcg Vaginal Nevaeh Roberts RN           miSOPROStol (Cytotec) tab 600 mcg       Date Action Dose Route User    7/18/2024 1923 Given 600 mcg Rectal Nevaeh Roberts RN          oxyTOCIN in sodium chloride 0.9% (Pitocin) 30 Units/500mL infusion premix       Date Action Dose Route User    7/18/2024 1522 Given 30 Units (none) Nevaeh Roberts RN          oxyTOCIN in sodium chloride 0.9% (Pitocin) 30 Units/500mL infusion premix       Date Action Dose Route User    7/18/2024 1552 New Bag 125 hunter-units/min Intravenous Nevaeh Roberts RN          oxyTOCIN in sodium chloride 0.9% (Pitocin) 30 Units/500mL infusion premix       Date Action Dose Route User    7/18/2024 1552 New Bag 125 hunter-units/min Intravenous Nevaeh Roberts RN          tranexamic acid in sodium chloride 0.7% (Cyklokapron) 1000 mg/100mL infusion premix       Date Action Dose Route User    7/18/2024 1919 Given 1,000 mg Intravenous Sarthak Connolly, DO            Vitals (last day)       Date/Time Temp Pulse Resp BP SpO2 Weight O2 Device O2 Flow Rate (L/min) [facility]    07/19/24 0649 -- 95 -- 102/52 -- -- -- --     07/19/24 0630 98.5 °F (36.9 °C) 54 16 89/43 -- -- -- --     07/18/24 2215 -- 84 -- 103/62 -- -- -- --     07/18/24 2200 -- 80 -- 99/67 -- -- -- --     07/18/24 2145 -- 80 -- 102/65 -- -- -- --     07/18/24 2130 -- 90 -- 104/62 -- -- -- --     07/18/24 2115 -- 97 -- 99/75 -- -- -- --     07/18/24 2100 -- 84 -- 111/66 -- -- -- --     07/18/24 2057 -- 84 -- 111/66 -- -- -- --     07/18/24 2045 -- 88 -- 98/66 -- -- -- --     07/18/24 2030 -- 80 -- 110/64 -- -- -- --     07/18/24 2015 98.2 °F (36.8 °C) 80 18 107/67 -- -- None (Room air) --     07/18/24 2000 -- 84 -- 107/56 -- -- -- --     07/18/24 1953 98 °F (36.7 °C) 67 16 110/72 98 % -- -- --     07/18/24 1826 -- 97 -- 152/62 -- -- -- --     07/18/24 1755 -- 85 -- 100/54 -- -- -- --     07/18/24 1740 -- 93 -- 91/42 -- -- -- --     07/18/24 1725 -- 86 -- 94/60 -- -- -- --     07/18/24 1655 -- 96 -- 115/58 -- -- --  -- BB 07/18/24 1640 -- 87 -- 99/63 -- -- -- -- BB 07/18/24 1625 -- 87 -- 104/63 -- -- -- -- BB 07/18/24 1610 -- 80 -- 103/64 -- -- -- -- BB 07/18/24 1555 -- 80 -- 99/64 -- -- -- -- BB 07/18/24 1540 -- 90 -- 102/60 -- -- -- -- BB 07/18/24 1525 -- 71 -- 111/65 -- -- -- -- BB 07/18/24 1454 98.3 °F (36.8 °C) 44 -- 77/47 -- -- -- -- BB 07/18/24 1354 -- 95 -- 97/52 -- -- -- --     07/18/24 1353 -- 100 -- 95/54 -- -- -- --     07/18/24 1350 -- 96 -- 93/54 -- -- -- --     07/18/24 1347 -- 94 -- 97/56 -- -- -- --     07/18/24 1345 -- 100 -- 101/51 98 % -- -- -- BB 07/18/24 1341 -- 97 -- 99/57 -- -- -- -- BB 07/18/24 1330 -- 115 -- 111/67 100 % -- -- --     07/18/24 1300 -- 97 -- 101/52 -- -- -- --     07/18/24 0830 98.7 °F (37.1 °C) 103 16 100/78 -- -- -- --     07/18/24 0445 98.3 °F (36.8 °C) -- -- -- -- -- -- --     07/18/24 0000 98.4 °F (36.9 °C) -- -- -- -- -- -- -- AM

## 2024-07-19 NOTE — PLAN OF CARE
Problem: PAIN - ADULT  Goal: Verbalizes/displays adequate comfort level or patient's stated pain goal  Description: INTERVENTIONS:  - Encourage pt to monitor pain and request assistance  - Assess pain using appropriate pain scale  - Administer analgesics based on type and severity of pain and evaluate response  - Implement non-pharmacological measures as appropriate and evaluate response  - Consider cultural and social influences on pain and pain management  - Manage/alleviate anxiety  - Utilize distraction and/or relaxation techniques  - Monitor for opioid side effects  - Notify MD/LIP if interventions unsuccessful or patient reports new pain  - Anticipate increased pain with activity and pre-medicate as appropriate  Outcome: Progressing     Problem: ANXIETY  Goal: Will report anxiety at manageable levels  Description: INTERVENTIONS:  - Administer medication as ordered  - Teach and rehearse alternative coping skills  - Provide emotional support with 1:1 interaction with staff  Outcome: Progressing     Problem: Patient/Family Goals  Goal: Patient/Family Long Term Goal  Description: Patient's Long Term Goal: To return home tomorrow.    Interventions:    - See additional Care Plan goals for specific interventions  Outcome: Progressing  Goal: Patient/Family Short Term Goal  Description: Patient's Short Term Goal: To recover with minimal bleeding and infection free.    Interventions:     - See additional Care Plan goals for specific interventions  Outcome: Progressing     Problem: Anxiety  Goal: Anxiety is at manageable level  Outcome: Progressing

## 2024-07-19 NOTE — CONSULTS
[facility]  Report of Psychiatric Consultation    Kelly Wood Patient Status:  Inpatient    1990 MRN [id number]   Location [facility] LABOR & DELIVERY Attending Sonia Stuart MD   Hosp Day # 2 PCP Oniel Ritter MD     Date of Admission: 2024  Date of Consult: 2024  Reason for Consultation: passive suicidal ideation    Impression:     Primary Psychiatric Diagnosis:  Passive suicidal ideation, denies plan or intent. Reports that in the moment of giving birth to her  baby girl that she did not want to be alive anymore. She is able to contract for safety.     Uncomplicated grief     Rule Out Diagnoses:  MDD  JAY  Adjustment disorder    Personality Traits:  Deferred     Pertinent Medical Diagnoses:  IUFD 19w5d on 2024    Recommendations:  Patient had passive suicidal ideation at time of delivery of her  19w5d daughter. Denies current thoughts. Denies ever having plan of intent. Can contract for safety.  Psychotherapist referrals to be placed in patient's discharge instructions.   Highly encouraged patient to attend SHARE program.    VENANCIO Meléndez NP-C      History of Present Illness:  Patient arrived to [facility] on 2024 with PPROM. She was 19w5d gestation with what is now know to be a baby girl. She had an IUFD on 2024. Birth and labor was obviously traumatic.     She had mentioned that during the moments of losing her baby she had thoughts about no longer wanting to live. She denies currently having any suicidal thoughts. Denied ever having plan or intent. She reports that she is worried about discharge home due to not wanting to go back home not pregnant when she is still supposed to be. She denies any feelings of hopelessness, helplessness, or worthlessness. She does have feelings of regret and questioning her actions. Reassurance given and patient is accepting of this. She reports that she has been eating. Did sleep last night but  woke up around 5 AM, which was around the time that she had to give birth to the daughter the day before. She became emotional and asked for support from staff.     Discussed if patient had any interest in starting psychiatric medications at this time, but patient is not interested. Did discuss the importance of seeking therapy during this time. She is open to psychotherapist referrals. RN indicated that patient will be in SHARE program as well. Discussed concerns about sleep once discharged. She reports that she was taking Unisom to help with nausea and sleep when she was pregnant. Patient educated that she may take Benadryl to aid sleep at home. Emotional support was provided.     Past Psychiatric History:  Denies any psychiatric history.    Substance Use History:  Denies any substance use.    Psych Family History:  Denies any family psychiatric history.    Social and Developmental History:   Patient reports that she has a . Patient and  are originally from [place]. Has a very supportive family. Denies any safety concerns.     Past Medical History:    H/O myomectomy     Past Surgical History:   Procedure Laterality Date    Prior myomectomy       History reviewed. No pertinent family history.   reports that she has never smoked. She has never used smokeless tobacco. She reports that she does not drink alcohol and does not use drugs.    Allergies:  No Known Allergies    Medications:    Current Facility-Administered Medications:     ibuprofen (Motrin) tab 600 mg, 600 mg, Oral, Q6H PRN    ondansetron (Zofran) 4 MG/2ML injection 4 mg, 4 mg, Intravenous, Q6H PRN    lactated ringers infusion, , Intravenous, Continuous    ampicillin (Omnipen) 2 g in sodium chloride 0.9% 100 mL IVPB-MBP, 2 g, Intravenous, Q6H    gentamicin (Garamycin) 300 mg in sodium chloride 0.9% 100 mL IVPB, 5 mg/kg (Ideal), Intravenous, Daily    lactated ringers IV bolus 1,000 mL, 1,000 mL, Intravenous, Once    fentaNYL-bupivacaine 2  mcg/mL-0.125% in sodium chloride 0.9% 100 mL EPIDURAL infusion premix, 12 mL/hr, Epidural, Continuous    fentaNYL (Sublimaze) 50 mcg/mL injection 100 mcg, 100 mcg, Epidural, Once    EPHEDrine (PF) 25 MG/5 ML injection 5 mg, 5 mg, Intravenous, PRN    nalbuphine (Nubain) 10 mg/mL injection 2.5 mg, 2.5 mg, Intravenous, Q15 Min PRN    oxyTOCIN in sodium chloride 0.9% (Pitocin) 30 Units/500mL infusion premix, 62.5-900 hunter-units/min, Intravenous, Continuous    miSOPROStol (Cytotec) tab 600 mcg, 600 mcg, Rectal, 4 times per day    acetaminophen (Tylenol Extra Strength) tab 500 mg, 500 mg, Oral, Q6H PRN **OR** acetaminophen (Tylenol Extra Strength) tab 1,000 mg, 1,000 mg, Oral, Q6H PRN    ibuprofen (Motrin) tab 600 mg, 600 mg, Oral, Q6H    docusate sodium (Colace) cap 100 mg, 100 mg, Oral, BID@0600,1800    magnesium hydroxide (Milk of Magnesia) 400 MG/5ML oral suspension 30 mL, 30 mL, Oral, Daily PRN    bisacodyl (Dulcolax) 10 MG rectal suppository 10 mg, 10 mg, Rectal, Once PRN    witch hazel-glycerin ( WITCH HAZEL) external pad, , Topical, PRN    simethicone (Mylicon) chewable tab 80 mg, 80 mg, Oral, TID PRN    phenylephrine-min oil-cristian (Formula R) 0.25-14-74.9 % rectal ointment, , Rectal, Daily PRN    lactated ringers infusion, , Intravenous, Continuous    acetaminophen (Tylenol Extra Strength) tab 500 mg, 500 mg, Oral, Q6H PRN **OR** [DISCONTINUED] acetaminophen (Tylenol Extra Strength) tab 1,000 mg, 1,000 mg, Oral, Q6H PRN    zolpidem (Ambien) tab 5 mg, 5 mg, Oral, Nightly PRN    calcium carbonate (Tums) chewable tab 1,000 mg, 1,000 mg, Oral, Daily PRN    prenatal vitamin with DHA (PNV with DHA) cap 1 capsule, 1 capsule, Oral, Daily    Review of Systems   Psychiatric/Behavioral:          Patient is appropriately grieving the loss of her daughter.    All other systems reviewed and are negative.      Psychiatry Review Systems: No voices or visions. No elevated mood, racing thoughts, decreased need for sleep,  grandiose thoughts, increased participation in risky behaviors, or history of trauma.     Mental Status Exam:     Risk Assessment  Suicidal ideation: passive thoughts of death / no suicidal ideation; specifically present when she was giving birth to her  baby girl yesterday morning  Homicidal ideation: None noted    Appearance: unremarkable  Behavior: unremarkable  Attitude: cooperative and consistent  Gait: Not observed    Speech: normal rate, rhythm and volume    Mood: sad, depressed, anxious, guilty, and hurt; a little angry  Affect: Congruent    Thought process: logical and sequential  Thought content: ruminations  Perceptions: no hallucinations  Associations: Intact    Orientation: Alert and oriented x 4  Attention and Concentration:   focused and attentive  Memory:  intact immediate, recent, remote  Language: Intact naming and repetition  Fund of Knowledge: Able to recite name of [place] president    Insight: Good   Judgment: Good     Objective    Vitals:    24 1345   BP: 103/65   Pulse: 75   Resp:    Temp: 98.3 °F (36.8 °C)       Patient Strengths/Assets:  Caring and compassionate.      Suicide Risk Assessments:  Overall level of suicide risk is low to moderate     Risk Factors: recent second pregnancy loss     Environmental Factors: supportive family    Protective Factors: her family    Laboratory Data:  Lab Results   Component Value Date    WBC 16.7 2024    HGB 11.6 2024    HCT 34.3 2024    .0 2024    CREATSERUM 0.51 2024    BUN 8 2024     2024    K 3.6 2024     2024    CO2 24.0 2024    GLU 95 2024    CA 8.8 2024    ALB 3.5 2024    ALKPHO 70 2024    BILT 0.2 2024    TP 6.4 2024    AST 36 2024    ALT 99 2024       STUDIES:    Eloina GOMEZC

## 2024-07-19 NOTE — PROGRESS NOTES
Patient talking with family and visitors at bedside. Pt reports small amount vag bleeding on peripad. Fundus remains firm 1/U

## 2024-07-19 NOTE — PROGRESS NOTES
Returned call to Psych Liaison Sophia. She states she placed consult to Psychiatry-Eloina FARRAR who will be seeing patient later today.

## 2024-07-19 NOTE — PROGRESS NOTES
Labor Analgesia Follow Up Note    Patient underwent epidural anesthesia for labor analgesia,    Placenta Date/Time: 7/18/2024  7:10 PM     Delivery Date/Time:: 7/18/2024   3:22 PM     /52   Pulse 95   Temp 98.5 °F (36.9 °C) (Oral)   Resp 16   Ht 1.702 m (5' 7\")   Wt 73 kg (161 lb)   LMP 03/01/2024 (Exact Date)   SpO2 98%   BMI 25.22 kg/m²     Assessment:  Patient seen and no apparent anesthesia related complications.    Thank you for asking us to participate in the care of your patient.

## 2024-07-19 NOTE — ANESTHESIA POSTPROCEDURE EVALUATION
[facility] Patient Status:  Inpatient   Age/Gender 34 year old female MRN [id number]   Location [facility] LABOR & DELIVERY Attending Sonia Stuart MD   Hosp Day # 1 PCP Oniel Ritter MD       Anesthesia Post-op Note    DILATION AND CURETTAGE    Procedure Summary       Date: 07/18/24 Room / Location:  L+D OR 02 /  L+D OR    Anesthesia Start: 1332 Anesthesia Stop:     Procedure: DILATION AND CURETTAGE (Bilateral) Diagnosis:     Surgeons: Ivon Cunningham MD Anesthesiologist: Sarthak Connolly DO    Anesthesia Type: epidural ASA Status: 2            Anesthesia Type: epidural    Vitals Value Taken Time   /72 07/18/24 1953   Temp 98 °F (36.7 °C) 07/18/24 1953   Pulse 67 07/18/24 1953   Resp 16 07/18/24 1953   SpO2 98 % 07/18/24 1953       Patient Location: Labor and Delivery    Anesthesia Type: epidural    Airway Patency: patent    Postop Pain Control: adequate    Mental Status: mildly sedated but able to meaningfully participate in the post-anesthesia evaluation    Nausea/Vomiting: none    Cardiopulmonary/Hydration status: stable euvolemic    Complications: no apparent anesthesia related complications    Postop vital signs: stable    Dental Exam: Unchanged from Preop    Patient to be discharged from PACU when criteria met.

## 2024-07-19 NOTE — PROGRESS NOTES
Psych Liaison stepped up consult to psychiatry due to safety concerns, per SW patient with passive suicidal ideations, open to medication evaluation to aid in mental health symptoms.

## 2024-07-19 NOTE — PROGRESS NOTES
Patient remains in room with her mother and aunt. Mother and Aunt leaving for . Patient tearful. RN remains supportive and comforting patient at bedside.

## 2024-07-19 NOTE — OPERATIVE REPORT
[facility]  Operative Note    Pre-Op Diagnosis: retained placenta    Post-Op Diagnosis: same    Procedure: suction D&C under ultrasound guidance    Surgeon: Ivon Cunningham MD    Anesthesia: epidural    Surgical Findings:  Grossly normal appearing placenta  Normal appearing cervix and vagina  Thin endometrial stripe on ultrasound post-procedure    Specimen: placenta    EBL:  100cc during procedure, approximately 450cc since delivery    Complications: None    Disposition: Recovery room in stable condition    Patient was taken to operating room and she already had an epidural from labor process. She was prepped and draped in the usual sterile fashion in the dorsal lithotomy position. EUA revealed the aforementioned findings. Pierre retractors were placed in the vagina and cervix was identified. The majority of the placenta was seen coming through the cervix and this was removed. Anterior lip of cervix was grasped with a ring forcep.  A size 14 curved curette was inserted into the uterus and suction device was activated under ultrasound guidance. Suction was rotated until all POCs were removed from the uterine cavity. A sharp curettage was then done and a gritty texture was noted at the uterine surface. A final pass with the suction was made. Instruments were removed from the vagina. Cytotec 6000mcg was inserted rectally and TXA 1g IV was administered at the end of the procedure. Patient tolerated the procedure well. Counts were correct x2.    Ivon Cunningham MD

## 2024-07-20 ENCOUNTER — APPOINTMENT (OUTPATIENT)
Dept: ULTRASOUND IMAGING | Facility: HOSPITAL | Age: 34
End: 2024-07-20
Attending: OBSTETRICS & GYNECOLOGY
Payer: COMMERCIAL

## 2024-07-20 ENCOUNTER — APPOINTMENT (OUTPATIENT)
Dept: ULTRASOUND IMAGING | Facility: HOSPITAL | Age: 34
DRG: 817 | End: 2024-07-20
Attending: OBSTETRICS & GYNECOLOGY
Payer: COMMERCIAL

## 2024-07-20 VITALS
HEIGHT: 67 IN | OXYGEN SATURATION: 98 % | HEART RATE: 75 BPM | RESPIRATION RATE: 16 BRPM | SYSTOLIC BLOOD PRESSURE: 94 MMHG | BODY MASS INDEX: 25.27 KG/M2 | DIASTOLIC BLOOD PRESSURE: 55 MMHG | WEIGHT: 161 LBS | TEMPERATURE: 98 F

## 2024-07-20 PROCEDURE — 76700 US EXAM ABDOM COMPLETE: CPT | Performed by: OBSTETRICS & GYNECOLOGY

## 2024-07-20 NOTE — PROGRESS NOTES
Postpartum Progress Note    SUBJECTIVE:  Postpartum day #2 s/p  at 19wks after PPROM    No overnight events.  Patient doing well without complaints.  Ambulating, tolerating PO, voiding, pain well controlled.    Many questions about her loss were discussed.    OBJECTIVE:    Vital signs in last 24 hours:  Temp:  [97.9 °F (36.6 °C)-98.4 °F (36.9 °C)] 97.9 °F (36.6 °C)  Pulse:  [55-75] 75  Resp:  [14-16] 16  BP: ()/(48-65) 94/55  Input/Output:    Intake/Output Summary (Last 24 hours) at 2024 09  Last data filed at 2024  Gross per 24 hour   Intake 2635 ml   Output --   Net 2635 ml     Gen: appears well, nad  Abdomin: nontender, fundus firm below umbilicus  Lochia:    Minimal to moderate  Extremity: nontender    Recent Labs   Lab 24  0541 24  1240 24  0958   RBC 4.43 4.36 3.94   HGB 13.1 12.8 11.6*   HCT 37.7 37.9 34.3*   MCV 85.1 86.9 87.1   MCH 29.6 29.4 29.4   MCHC 34.7 33.8 33.8   RDW 13.1 13.3 13.1   NEPRELIM 14.84* 14.50* 12.45*   WBC 19.0* 18.3* 16.7*   .0 242.0 230.0     Recent Labs   Lab 24  1553 24  0541 24  0958   GLU 99 93 95   BUN <5* <5* 8*   CREATSERUM 0.47* 0.51* 0.51*   EGFRCR 130 127 127   CA 9.2 9.2 8.8   ALB 3.8 4.0 3.5    135* 138   K 3.6 3.4* 3.6    106 108   CO2 24.0 23.0 24.0   ALKPHO 75 76 70   AST 49* 54* 36*   * 138* 99*   BILT 0.4 0.6 0.2*   TP 7.0 7.3 6.4     RUQ US: normal    ASSESSMENT/PLAN:  Postpartum Day #2 s/p  at 19 weeks after PPROM.  Appropriately grieving  Discharge home today - instructions reviewed, follow up in 1-2 weeks for PP visit  Plan to repeat LFTs after f/u visit  RUQ US normal today  Discussed preconception consult with MFM after about 12 weeks    Tiffanie Younger MD  2024  9:43 AM

## 2024-07-20 NOTE — PROGRESS NOTES
Discharged to home per wheelchair in stable condition with written and verbal instructions. Patient verbalizes understanding of information given.

## 2024-07-20 NOTE — PLAN OF CARE
Problem: Patient/Family Goals  Goal: Patient/Family Long Term Goal  Description: Patient's Long Term Goal: To return home tomorrow.    Interventions:    - See additional Care Plan goals for specific interventions  Outcome: Progressing  Goal: Patient/Family Short Term Goal  Description: Patient's Short Term Goal: To recover with minimal bleeding and infection free.    Interventions:     - See additional Care Plan goals for specific interventions  Outcome: Progressing     Problem: Anxiety  Goal: Anxiety is at manageable level  Outcome: Progressing     Problem: Spiritual Needs  Goal: Ability to function at adequate level  Outcome: Progressing

## 2024-07-22 LAB
PARVO B19 IGG: 0.4 INDEX
PARVO B19 IGM: 0.3 INDEX

## 2024-07-25 LAB — TOXO GONDII PCR: NEGATIVE

## 2024-07-26 ENCOUNTER — ULTRASOUND ENCOUNTER (OUTPATIENT)
Dept: PERINATAL CARE | Facility: HOSPITAL | Age: 34
End: 2024-07-26
Attending: OBSTETRICS & GYNECOLOGY
Payer: COMMERCIAL

## 2024-07-26 ENCOUNTER — TELEPHONE (OUTPATIENT)
Dept: OBGYN UNIT | Facility: HOSPITAL | Age: 34
End: 2024-07-26

## 2024-07-26 DIAGNOSIS — O34.10 UTERINE FIBROIDS AFFECTING PREGNANCY (HCC): ICD-10-CM

## 2024-07-26 DIAGNOSIS — Z98.890 HISTORY OF MYOMECTOMY: ICD-10-CM

## 2024-07-26 DIAGNOSIS — D25.9 UTERINE FIBROIDS AFFECTING PREGNANCY (HCC): ICD-10-CM

## 2024-07-30 ENCOUNTER — HOSPITAL ENCOUNTER (EMERGENCY)
Facility: HOSPITAL | Age: 34
Discharge: HOME | End: 2024-07-30
Attending: STUDENT IN AN ORGANIZED HEALTH CARE EDUCATION/TRAINING PROGRAM
Payer: COMMERCIAL

## 2024-07-30 VITALS
RESPIRATION RATE: 16 BRPM | TEMPERATURE: 100 F | SYSTOLIC BLOOD PRESSURE: 91 MMHG | BODY MASS INDEX: 25.27 KG/M2 | WEIGHT: 161 LBS | OXYGEN SATURATION: 98 % | HEART RATE: 78 BPM | HEIGHT: 67 IN | DIASTOLIC BLOOD PRESSURE: 63 MMHG

## 2024-07-30 DIAGNOSIS — U07.1 COVID-19 VIRUS INFECTION: Primary | ICD-10-CM

## 2024-07-30 LAB
ALBUMIN SERPL-MCNC: 4.2 G/DL (ref 3.2–4.8)
ALBUMIN/GLOB SERPL: 1.4 {RATIO} (ref 1–2)
ALP LIVER SERPL-CCNC: 106 U/L
ALT SERPL-CCNC: 54 U/L
ANION GAP SERPL CALC-SCNC: 6 MMOL/L (ref 0–18)
AST SERPL-CCNC: 39 U/L (ref ?–34)
BASOPHILS # BLD AUTO: 0.02 X10(3) UL (ref 0–0.2)
BASOPHILS NFR BLD AUTO: 0.2 %
BILIRUB SERPL-MCNC: 0.2 MG/DL (ref 0.3–1.2)
BILIRUB UR QL STRIP.AUTO: NEGATIVE
BUN BLD-MCNC: 7 MG/DL (ref 9–23)
CALCIUM BLD-MCNC: 8.5 MG/DL (ref 8.7–10.4)
CHLORIDE SERPL-SCNC: 107 MMOL/L (ref 98–112)
CLARITY UR REFRACT.AUTO: CLEAR
CO2 SERPL-SCNC: 24 MMOL/L (ref 21–32)
COLOR UR AUTO: COLORLESS
CREAT BLD-MCNC: 0.68 MG/DL
EGFRCR SERPLBLD CKD-EPI 2021: 119 ML/MIN/1.73M2 (ref 60–?)
EOSINOPHIL # BLD AUTO: 0.17 X10(3) UL (ref 0–0.7)
EOSINOPHIL NFR BLD AUTO: 1.7 %
ERYTHROCYTE [DISTWIDTH] IN BLOOD BY AUTOMATED COUNT: 12.4 %
GLOBULIN PLAS-MCNC: 3 G/DL (ref 2–3.5)
GLUCOSE BLD-MCNC: 111 MG/DL (ref 70–99)
GLUCOSE UR STRIP.AUTO-MCNC: NORMAL MG/DL
HCT VFR BLD AUTO: 39.1 %
HGB BLD-MCNC: 13.2 G/DL
IMM GRANULOCYTES # BLD AUTO: 0.05 X10(3) UL (ref 0–1)
IMM GRANULOCYTES NFR BLD: 0.5 %
KETONES UR STRIP.AUTO-MCNC: NEGATIVE MG/DL
LACTATE SERPL-SCNC: 2 MMOL/L (ref 0.5–2)
LEUKOCYTE ESTERASE UR QL STRIP.AUTO: NEGATIVE
LYMPHOCYTES # BLD AUTO: 0.66 X10(3) UL (ref 1–4)
LYMPHOCYTES NFR BLD AUTO: 6.5 %
MCH RBC QN AUTO: 29.6 PG (ref 26–34)
MCHC RBC AUTO-ENTMCNC: 33.8 G/DL (ref 31–37)
MCV RBC AUTO: 87.7 FL
MONOCYTES # BLD AUTO: 0.75 X10(3) UL (ref 0.1–1)
MONOCYTES NFR BLD AUTO: 7.3 %
NEUTROPHILS # BLD AUTO: 8.58 X10 (3) UL (ref 1.5–7.7)
NEUTROPHILS # BLD AUTO: 8.58 X10(3) UL (ref 1.5–7.7)
NEUTROPHILS NFR BLD AUTO: 83.8 %
NITRITE UR QL STRIP.AUTO: NEGATIVE
OSMOLALITY SERPL CALC.SUM OF ELEC: 283 MOSM/KG (ref 275–295)
PH UR STRIP.AUTO: 6.5 [PH] (ref 5–8)
PLATELET # BLD AUTO: 296 10(3)UL (ref 150–450)
POTASSIUM SERPL-SCNC: 3.5 MMOL/L (ref 3.5–5.1)
PROT SERPL-MCNC: 7.2 G/DL (ref 5.7–8.2)
PROT UR STRIP.AUTO-MCNC: NEGATIVE MG/DL
RBC # BLD AUTO: 4.46 X10(6)UL
SARS-COV-2 RNA RESP QL NAA+PROBE: DETECTED
SODIUM SERPL-SCNC: 137 MMOL/L (ref 136–145)
SP GR UR STRIP.AUTO: 1.01 (ref 1–1.03)
UROBILINOGEN UR STRIP.AUTO-MCNC: NORMAL MG/DL
WBC # BLD AUTO: 10.2 X10(3) UL (ref 4–11)

## 2024-07-30 PROCEDURE — 80053 COMPREHEN METABOLIC PANEL: CPT | Performed by: STUDENT IN AN ORGANIZED HEALTH CARE EDUCATION/TRAINING PROGRAM

## 2024-07-30 PROCEDURE — 99285 EMERGENCY DEPT VISIT HI MDM: CPT

## 2024-07-30 PROCEDURE — 81001 URINALYSIS AUTO W/SCOPE: CPT | Performed by: STUDENT IN AN ORGANIZED HEALTH CARE EDUCATION/TRAINING PROGRAM

## 2024-07-30 PROCEDURE — 96360 HYDRATION IV INFUSION INIT: CPT

## 2024-07-30 PROCEDURE — 87040 BLOOD CULTURE FOR BACTERIA: CPT | Performed by: STUDENT IN AN ORGANIZED HEALTH CARE EDUCATION/TRAINING PROGRAM

## 2024-07-30 PROCEDURE — 85025 COMPLETE CBC W/AUTO DIFF WBC: CPT | Performed by: STUDENT IN AN ORGANIZED HEALTH CARE EDUCATION/TRAINING PROGRAM

## 2024-07-30 PROCEDURE — 36415 COLL VENOUS BLD VENIPUNCTURE: CPT

## 2024-07-30 PROCEDURE — 96361 HYDRATE IV INFUSION ADD-ON: CPT

## 2024-07-30 PROCEDURE — 99284 EMERGENCY DEPT VISIT MOD MDM: CPT

## 2024-07-30 PROCEDURE — 83605 ASSAY OF LACTIC ACID: CPT | Performed by: STUDENT IN AN ORGANIZED HEALTH CARE EDUCATION/TRAINING PROGRAM

## 2024-07-30 RX ORDER — IBUPROFEN 600 MG/1
600 TABLET ORAL ONCE
Status: COMPLETED | OUTPATIENT
Start: 2024-07-30 | End: 2024-07-30

## 2024-07-30 RX ORDER — ACETAMINOPHEN 500 MG
1000 TABLET ORAL ONCE
Status: COMPLETED | OUTPATIENT
Start: 2024-07-30 | End: 2024-07-30

## 2024-07-30 NOTE — ED INITIAL ASSESSMENT (HPI)
Patient had stillbirth approx. 2 weeks ago and is now complaining of fever of 105. Patient complains of headache. Patient complains of normal postpartum vaginal bleeding. Patient states she took an ibuprofen at approx. 1100.

## 2024-07-31 NOTE — CM/SW NOTE
Received a request from the pt to email ERMDs note for work @ [email or web address]. andiem spoke to the pt and emailed the pt's ERMDs note to the above email address provided by the pt.

## 2024-07-31 NOTE — ED PROVIDER NOTES
Patient Seen in: [facility] Emergency Department      History     Chief Complaint   Patient presents with    Fever    Postpartum Care     Stated Complaint: post-partum - high fever    Subjective:   HPI    Patient is a 34-year-old female who delivered at 19 weeks 6 days on July 18 with P PROM 19 weeks and subsequent DC for retained placenta presented to the emergency room with reports of fever.  She tells me today she had a fever saying that was 105.  She states she has had a headache and a slight scratchy throat and myalgias.  She denies any lower abdominal pain or cramping and states that she has had very light nonfoul-smelling vaginal bleeding.  The patient also denies any productive cough nausea vomiting or diarrhea. patient and her  did go visit family members in [place] over the weekend.    Objective:   Past Medical History:    H/O myomectomy              Past Surgical History:   Procedure Laterality Date    Prior myomectomy                  Social History     Socioeconomic History    Marital status:    Tobacco Use    Smoking status: Never    Smokeless tobacco: Never   Vaping Use    Vaping status: Never Used   Substance and Sexual Activity    Alcohol use: Never    Drug use: Never     Social Determinants of Health     Financial Resource Strain: Low Risk  (7/17/2024)    Financial Resource Strain     Difficulty of Paying Living Expenses: Not hard at all     Med Affordability: No   Food Insecurity: No Food Insecurity (7/17/2024)    Food Insecurity     Food Insecurity: Never true   Transportation Needs: No Transportation Needs (7/17/2024)    Transportation Needs     Lack of Transportation: No   Stress: No Stress Concern Present (7/17/2024)    Stress     Feeling of Stress : No   Housing Stability: Low Risk  (7/17/2024)    Housing Stability     Housing Instability: No              Review of Systems    Positive for stated Chief Complaint: Fever and Postpartum Care    Other systems are as noted in  HPI.  Constitutional and vital signs reviewed.      All other systems reviewed and negative except as noted above.    Physical Exam     ED Triage Vitals   BP 07/30/24 1821 104/74   Pulse 07/30/24 1821 100   Resp 07/30/24 1821 18   Temp 07/30/24 1821 99.5 °F (37.5 °C)   Temp src 07/30/24 1821 Oral   SpO2 07/30/24 1900 100 %   O2 Device 07/30/24 2140 None (Room air)       Current Vitals:   Vital Signs  BP: 91/63  Pulse: 78  Resp: 16  Temp: 100.4 °F (38 °C)  Temp src: Oral  MAP (mmHg): 72    Oxygen Therapy  SpO2: 98 %  O2 Device: None (Room air)            Physical Exam  Vitals and nursing note reviewed.   Constitutional:       General: She is not in acute distress.     Appearance: Normal appearance.   HENT:      Head: Normocephalic.      Nose: Nose normal.      Mouth/Throat:      Mouth: Mucous membranes are moist.   Eyes:      Extraocular Movements: Extraocular movements intact.      Conjunctiva/sclera: Conjunctivae normal.      Pupils: Pupils are equal, round, and reactive to light.   Cardiovascular:      Rate and Rhythm: Normal rate and regular rhythm.      Pulses: Normal pulses.   Pulmonary:      Effort: Pulmonary effort is normal.      Breath sounds: Normal breath sounds.   Abdominal:      General: Abdomen is flat. Bowel sounds are normal. There is no distension.      Palpations: Abdomen is soft.      Tenderness: There is no abdominal tenderness. There is no right CVA tenderness, left CVA tenderness, guarding or rebound.      Hernia: No hernia is present.   Musculoskeletal:         General: No swelling or tenderness. Normal range of motion.      Cervical back: Normal range of motion. No rigidity.   Skin:     General: Skin is warm and dry.   Neurological:      Mental Status: She is alert and oriented to person, place, and time. Mental status is at baseline.   Psychiatric:         Mood and Affect: Mood normal.               ED Course     Labs Reviewed   COMP METABOLIC PANEL (14) - Abnormal; Notable for the following  components:       Result Value    Glucose 111 (*)     BUN 7 (*)     Calcium, Total 8.5 (*)     AST 39 (*)     ALT 54 (*)     Alkaline Phosphatase 106 (*)     Bilirubin, Total 0.2 (*)     All other components within normal limits   URINALYSIS WITH CULTURE REFLEX - Abnormal; Notable for the following components:    Urine Color Colorless (*)     Blood Urine Trace (*)     Bacteria Urine Rare (*)     Squamous Epi. Cells Few (*)     All other components within normal limits   RAPID SARS-COV-2 BY PCR - Abnormal; Notable for the following components:    Rapid SARS-CoV-2 by PCR Detected (*)     All other components within normal limits   CBC W/ DIFFERENTIAL - Abnormal; Notable for the following components:    Neutrophil Absolute Prelim 8.58 (*)     Neutrophil Absolute 8.58 (*)     Lymphocyte Absolute 0.66 (*)     All other components within normal limits   LACTIC ACID, PLASMA - Normal   CBC WITH DIFFERENTIAL WITH PLATELET    Narrative:     The following orders were created for panel order CBC With Differential With Platelet.  Procedure                               Abnormality         Status                     ---------                               -----------         ------                     CBC W/ DIFFERENTIAL[[id number]]          Abnormal            Final result                 Please view results for these tests on the individual orders.   RAINBOW DRAW LAVENDER   RAINBOW DRAW LIGHT GREEN   RAINBOW DRAW BLUE   BLOOD CULTURE   BLOOD CULTURE       MDM      Medical Decision Making  The differential includes the following  Viral infection such as covid 19 infection,   Endometritis  Retained products w/ sepsis  Meningitis   Cystitis, pyelonephritis    Pertinent comorbidities include  As listed above     Pertinent social history includes  As listed above     Labs  White blood cell count is 10.2, lactic acid is 2  LFTs stable from July 19  Urinalysis without clear signs of infection  COVID-19 swab is positive        External  data reviewed  OB/GYN note from July 26    Discussion of management with external providers  On-call OB/GYN Dr. Rutherford    ER course  On arrival to the ER patient had a temp of 99 and 100.4.  On exam patient is awake and alert x 4.  She has no neck stiffness or other signs of meningismus.  Heart rate slightly elevated.  Lung sounds clear.  Being at the patient is postpartum I did consider endometritis as well as retained products and concerns for sepsis therefore blood cultures lactic acid were obtained.  Lactic acid white blood cell count negative.  In regards to endometritis and retained products patient denies any abdominal or pelvic discomfort.  She states that she has had decreasing vaginal bleeding and denies any foul odor to it.      Despite these reports I did discuss performing a pelvic exam with the patient and her  in the room and ultimately they declined this based on patient stating she has no at forementioned symptoms.    Patient's presentation and results were discussed with on-call OB/GYN after patient tested positive for COVID.  At this time they are not recommending any further imaging or workup.  Patient was given Tylenol here she spiked a temp to 100.4 along with Motrin.    All of the patient and family members questions were answered to the best of my ability.  Patient discharged with a work note with recommendation take Tylenol ibuprofen return to the ER for any worsening symptoms.  Disposition and Plan     Clinical Impression:  1. COVID-19 virus infection         Disposition:  Discharge  7/30/2024  9:12 pm    Follow-up:  Oniel Ritter MD  [address] [place] [address] [address] [place] [address]  [phone]    Follow up  return to the ER for worse symptoms          Medications Prescribed:  Current Discharge Medication List

## 2025-06-25 ENCOUNTER — APPOINTMENT (OUTPATIENT)
Dept: URBAN - METROPOLITAN AREA CLINIC 247 | Age: 35
Setting detail: DERMATOLOGY
End: 2025-06-25

## 2025-06-25 DIAGNOSIS — L259 CONTACT DERMATITIS AND OTHER ECZEMA, UNSPECIFIED CAUSE: ICD-10-CM

## 2025-06-25 PROBLEM — L23.9 ALLERGIC CONTACT DERMATITIS, UNSPECIFIED CAUSE: Status: ACTIVE | Noted: 2025-06-25

## 2025-06-25 PROCEDURE — OTHER ADDITIONAL NOTES: OTHER

## 2025-06-25 PROCEDURE — OTHER PRESCRIPTION: OTHER

## 2025-06-25 PROCEDURE — OTHER PRESCRIPTION MEDICATION MANAGEMENT: OTHER

## 2025-06-25 PROCEDURE — OTHER COUNSELING: OTHER

## 2025-06-25 PROCEDURE — OTHER MIPS QUALITY: OTHER

## 2025-06-25 RX ORDER — TRIAMCINOLONE ACETONIDE 1 MG/G
OINTMENT TOPICAL
Qty: 15 | Refills: 0 | Status: ERX | COMMUNITY
Start: 2025-06-25

## 2025-06-25 ASSESSMENT — LOCATION DETAILED DESCRIPTION DERM: LOCATION DETAILED: RIGHT SUPERIOR VERMILION LIP

## 2025-06-25 ASSESSMENT — LOCATION ZONE DERM: LOCATION ZONE: LIP

## 2025-06-25 ASSESSMENT — LOCATION SIMPLE DESCRIPTION DERM: LOCATION SIMPLE: RIGHT LIP

## (undated) NOTE — LETTER
Date & Time: 7/30/2024, 9:12 PM  Patient: Kelly Wood  Encounter Provider(s):    Obdulia Ayoub MD       To Whom It May Concern:    Kelly Wood was seen and treated in our department on 7/30/2024. She should not return to work until Monday Aug 5th .    If you have any questions or concerns, please do not hesitate to call.        _____________________________  Physician/APC Signature